# Patient Record
Sex: FEMALE | Race: BLACK OR AFRICAN AMERICAN | Employment: FULL TIME | URBAN - METROPOLITAN AREA
[De-identification: names, ages, dates, MRNs, and addresses within clinical notes are randomized per-mention and may not be internally consistent; named-entity substitution may affect disease eponyms.]

---

## 2018-05-27 ENCOUNTER — HOSPITAL ENCOUNTER (EMERGENCY)
Age: 53
Discharge: HOME | End: 2018-05-27
Payer: COMMERCIAL

## 2018-05-27 VITALS
BODY MASS INDEX: 45.52 KG/M2 | OXYGEN SATURATION: 97 % | HEIGHT: 67 IN | DIASTOLIC BLOOD PRESSURE: 72 MMHG | SYSTOLIC BLOOD PRESSURE: 132 MMHG | WEIGHT: 290 LBS | RESPIRATION RATE: 18 BRPM | HEART RATE: 86 BPM | TEMPERATURE: 98.6 F

## 2018-05-27 DIAGNOSIS — S29.019A THORACIC MYOFASCIAL STRAIN, INITIAL ENCOUNTER: Primary | ICD-10-CM

## 2018-05-27 PROCEDURE — 96372 THER/PROPH/DIAG INJ SC/IM: CPT

## 2018-05-27 PROCEDURE — 99282 EMERGENCY DEPT VISIT SF MDM: CPT

## 2018-05-27 PROCEDURE — 6360000002 HC RX W HCPCS: Performed by: NURSE PRACTITIONER

## 2018-05-27 RX ORDER — CYCLOBENZAPRINE HCL 10 MG
10 TABLET ORAL 3 TIMES DAILY PRN
Qty: 10 TABLET | Refills: 0 | Status: SHIPPED | OUTPATIENT
Start: 2018-05-27 | End: 2019-06-08 | Stop reason: ALTCHOICE

## 2018-05-27 RX ORDER — NAPROXEN 500 MG/1
500 TABLET ORAL 2 TIMES DAILY PRN
Qty: 14 TABLET | Refills: 0 | Status: SHIPPED | OUTPATIENT
Start: 2018-05-27 | End: 2019-06-08

## 2018-05-27 RX ORDER — KETOROLAC TROMETHAMINE 30 MG/ML
60 INJECTION, SOLUTION INTRAMUSCULAR; INTRAVENOUS ONCE
Status: COMPLETED | OUTPATIENT
Start: 2018-05-27 | End: 2018-05-27

## 2018-05-27 RX ORDER — DEXAMETHASONE SODIUM PHOSPHATE 10 MG/ML
10 INJECTION, SOLUTION INTRAMUSCULAR; INTRAVENOUS ONCE
Status: COMPLETED | OUTPATIENT
Start: 2018-05-27 | End: 2018-05-27

## 2018-05-27 RX ADMIN — KETOROLAC TROMETHAMINE 60 MG: 30 INJECTION, SOLUTION INTRAMUSCULAR at 22:32

## 2018-05-27 RX ADMIN — DEXAMETHASONE SODIUM PHOSPHATE 10 MG: 10 INJECTION, SOLUTION INTRAMUSCULAR; INTRAVENOUS at 23:07

## 2018-05-27 ASSESSMENT — PAIN DESCRIPTION - FREQUENCY
FREQUENCY: CONTINUOUS
FREQUENCY: CONTINUOUS

## 2018-05-27 ASSESSMENT — PAIN SCALES - GENERAL
PAINLEVEL_OUTOF10: 5
PAINLEVEL_OUTOF10: 10
PAINLEVEL_OUTOF10: 9

## 2018-05-27 ASSESSMENT — PAIN DESCRIPTION - PROGRESSION
CLINICAL_PROGRESSION: GRADUALLY WORSENING
CLINICAL_PROGRESSION: GRADUALLY IMPROVING

## 2018-05-27 ASSESSMENT — PAIN DESCRIPTION - DESCRIPTORS
DESCRIPTORS: SHARP
DESCRIPTORS: ACHING

## 2018-05-27 ASSESSMENT — PAIN DESCRIPTION - PAIN TYPE
TYPE: ACUTE PAIN
TYPE: ACUTE PAIN

## 2018-05-27 ASSESSMENT — PAIN DESCRIPTION - LOCATION
LOCATION: BACK
LOCATION: BACK

## 2018-05-27 ASSESSMENT — PAIN - FUNCTIONAL ASSESSMENT: PAIN_FUNCTIONAL_ASSESSMENT: 0-10

## 2018-05-27 ASSESSMENT — PAIN DESCRIPTION - ONSET
ONSET: SUDDEN
ONSET: ON-GOING

## 2018-05-27 ASSESSMENT — PAIN DESCRIPTION - ORIENTATION
ORIENTATION: UPPER;RIGHT
ORIENTATION: RIGHT;UPPER

## 2019-04-05 ENCOUNTER — HOSPITAL ENCOUNTER (OUTPATIENT)
Age: 54
Discharge: HOME | End: 2019-04-07
Payer: COMMERCIAL

## 2019-04-05 ENCOUNTER — HOSPITAL ENCOUNTER (OUTPATIENT)
Dept: ULTRASOUND IMAGING | Age: 54
Discharge: HOME | End: 2019-04-07
Payer: COMMERCIAL

## 2019-04-05 ENCOUNTER — HOSPITAL ENCOUNTER (OUTPATIENT)
Dept: GENERAL RADIOLOGY | Age: 54
Discharge: HOME | End: 2019-04-07
Payer: COMMERCIAL

## 2019-04-05 DIAGNOSIS — R07.89 OTHER CHEST PAIN: ICD-10-CM

## 2019-04-05 DIAGNOSIS — R22.32 AXILLARY MASS, LEFT: ICD-10-CM

## 2019-04-05 PROCEDURE — 76882 US LMTD JT/FCL EVL NVASC XTR: CPT

## 2019-04-05 PROCEDURE — 71101 X-RAY EXAM UNILAT RIBS/CHEST: CPT

## 2019-06-08 ENCOUNTER — HOSPITAL ENCOUNTER (EMERGENCY)
Age: 54
Discharge: HOME | End: 2019-06-08
Payer: COMMERCIAL

## 2019-06-08 ENCOUNTER — APPOINTMENT (OUTPATIENT)
Dept: GENERAL RADIOLOGY | Age: 54
End: 2019-06-08
Payer: COMMERCIAL

## 2019-06-08 VITALS
OXYGEN SATURATION: 99 % | HEART RATE: 66 BPM | TEMPERATURE: 97.8 F | BODY MASS INDEX: 45.99 KG/M2 | DIASTOLIC BLOOD PRESSURE: 88 MMHG | HEIGHT: 67 IN | RESPIRATION RATE: 14 BRPM | WEIGHT: 293 LBS | SYSTOLIC BLOOD PRESSURE: 132 MMHG

## 2019-06-08 DIAGNOSIS — S39.012A LUMBOSACRAL STRAIN, INITIAL ENCOUNTER: Primary | ICD-10-CM

## 2019-06-08 DIAGNOSIS — R03.0 ELEVATED BLOOD PRESSURE READING: ICD-10-CM

## 2019-06-08 PROCEDURE — 6360000002 HC RX W HCPCS: Performed by: NURSE PRACTITIONER

## 2019-06-08 PROCEDURE — 99283 EMERGENCY DEPT VISIT LOW MDM: CPT

## 2019-06-08 PROCEDURE — 72100 X-RAY EXAM L-S SPINE 2/3 VWS: CPT

## 2019-06-08 PROCEDURE — 96372 THER/PROPH/DIAG INJ SC/IM: CPT

## 2019-06-08 RX ORDER — CHLORZOXAZONE 500 MG/1
500 TABLET ORAL 3 TIMES DAILY PRN
Qty: 15 TABLET | Refills: 0 | Status: SHIPPED | OUTPATIENT
Start: 2019-06-08 | End: 2019-06-13

## 2019-06-08 RX ORDER — NAPROXEN 500 MG/1
500 TABLET ORAL 2 TIMES DAILY WITH MEALS
Qty: 20 TABLET | Refills: 0 | Status: SHIPPED | OUTPATIENT
Start: 2019-06-08 | End: 2019-06-18

## 2019-06-08 RX ORDER — KETOROLAC TROMETHAMINE 30 MG/ML
30 INJECTION, SOLUTION INTRAMUSCULAR; INTRAVENOUS ONCE
Status: COMPLETED | OUTPATIENT
Start: 2019-06-08 | End: 2019-06-08

## 2019-06-08 RX ADMIN — KETOROLAC TROMETHAMINE 30 MG: 30 INJECTION, SOLUTION INTRAMUSCULAR at 18:35

## 2019-06-08 ASSESSMENT — PAIN DESCRIPTION - DESCRIPTORS
DESCRIPTORS: CONSTANT;DISCOMFORT
DESCRIPTORS: SHARP;RADIATING;THROBBING

## 2019-06-08 ASSESSMENT — PAIN DESCRIPTION - LOCATION
LOCATION: BACK
LOCATION: BACK

## 2019-06-08 ASSESSMENT — PAIN SCALES - GENERAL
PAINLEVEL_OUTOF10: 7
PAINLEVEL_OUTOF10: 10
PAINLEVEL_OUTOF10: 10

## 2019-06-08 ASSESSMENT — PAIN DESCRIPTION - PROGRESSION: CLINICAL_PROGRESSION: NOT CHANGED

## 2019-06-08 ASSESSMENT — PAIN DESCRIPTION - PAIN TYPE
TYPE: ACUTE PAIN
TYPE: ACUTE PAIN

## 2019-06-08 ASSESSMENT — PAIN DESCRIPTION - ORIENTATION
ORIENTATION: LOWER;RIGHT;LEFT
ORIENTATION: LOWER

## 2019-06-08 ASSESSMENT — PAIN - FUNCTIONAL ASSESSMENT
PAIN_FUNCTIONAL_ASSESSMENT: ACTIVITIES ARE NOT PREVENTED
PAIN_FUNCTIONAL_ASSESSMENT: 0-10

## 2019-06-08 ASSESSMENT — PAIN DESCRIPTION - ONSET
ONSET: ON-GOING
ONSET: ON-GOING

## 2019-06-08 ASSESSMENT — PAIN DESCRIPTION - FREQUENCY: FREQUENCY: CONTINUOUS

## 2019-06-08 NOTE — ED NOTES
Patient verbalized understanding of d/c, f/u & Rx instructions. Patient ambulatory to exit.       Clay Ricardo RN  06/08/19 0250

## 2019-06-08 NOTE — ED PROVIDER NOTES
Independent MLP    HPI: Rony Centeno 49 y. o.female with   Past Medical History:   Diagnosis Date    Degenerative arthritis     of lower spine    GERD (gastroesophageal reflux disease)     Headache(784.0)     Hypertension     Hypothyroidism     Obesity     Osteoarthritis      Who presents from home by herself and complains of continued midline low back pain that radiates to her bilateral hips that began two days ago after feeling a pop after leaning forward. The history is obtained from the patient. The mechanism of the injury is apparent. The patient states that the pain is moderate. It is worsened by movement including flexion and extension of the back as well as rotation. Patient denies any distal neurovascular complaints including numbness tingling or weakness. There are no bowel or bladder symptoms reported. The patient denies saddle or groin anesthesia. The patient also denies fevers, chills, weight loss, night sweats, IV drug use, or recent illness.        ROS:   Pertinent positives and negatives are stated within HPI, all other systems reviewed and are negative.    --------------------------------------------- PAST HISTORY ---------------------------------------------  Past Medical History:  has a past medical history of Degenerative arthritis, GERD (gastroesophageal reflux disease), Headache(784.0), Hypertension, Hypothyroidism, Obesity, and Osteoarthritis. Past Surgical History:  has a past surgical history that includes Cholecystectomy; Tonsillectomy; Tubal ligation; and cyst removal.    Social History:  reports that she quit smoking about 24 years ago. Her smoking use included cigarettes. She has a 1.75 pack-year smoking history. She has never used smokeless tobacco. She reports that she drinks about 9.6 oz of alcohol per week. She reports that she does not use drugs.     Family History: family history includes Cancer in her maternal grandmother; Coronary Art Dis in her mother; Heart Attack in her maternal grandfather and mother; Heart Failure in her father; Heart Surgery in her mother; Hypertension in her father, mother, sister, and sister. The patients home medications have been reviewed. Allergies: Patient has no known allergies. ------------------------- NURSING NOTES AND VITALS REVIEWED ---------------------------   The nursing notes within the ED encounter and vital signs as below have been reviewed by myself. /88   Pulse 66   Temp 97.8 °F (36.6 °C) (Oral)   Resp 14   Ht 5' 7\" (1.702 m)   Wt 300 lb (136.1 kg)   LMP 02/14/2013 (Approximate)   SpO2 99%   BMI 46.99 kg/m²   Oxygen Saturation Interpretation: Normal    The patients available past medical records and past encounters were reviewed. Physical exam:    Constitutional:  The patient is comfortable, well appearing, non toxic in NAD. Patient is alert and oriented x3. Head: Head is atraumatic and normocephalic. Eyes: There is no discharge from the eyes. Sclerae are normal.  ENT: The oropharynx is normal. The mouth is normal to inspection. Neck: Normal range of motion of the neck is present there is no JVD present no meningeal signs are present. Respiratory/chest: The chest is nontender breath sounds are normal  Cardiovascular: Regular rate and rhythm is noted. No murmurs. No rubs or gallops. Skin: Skin is warm and dry, Skin exam normal  Neurologic exam: The patient's Tobi Coma Scale is 15. No focal motor deficits. There are no focal sensory deficits. Deep tendon reflexes are intact and present bilaterally in the lower extremities. Babinski is absent. Gait is normal. Symmetric strength and sensation in the lower extremities bilaterally. Back exam: The patient has reproducible tenderness to palpation in the paravertebral lumbar region bilaterally. There is no evidence of localized erythema nor is there any focal warmth to the back.  The patient has no evidence of single vertebral tenderness or any single area of interspace tenderness. There are no palpable deformities, lacerations, abrasions, or stepoffs. No midline cervical, thoracic, or lumbar spine tenderness.           -------------------------------------------------- RESULTS -------------------------------------------------  I have personally reviewed all laboratory and imaging results for this patient. Results are listed below. LABS:  No results found for this visit on 06/08/19. RADIOLOGY:  Interpreted by Radiologist.  XR LUMBAR SPINE (2-3 VIEWS)   Final Result      1. No evidence of lumbar spine fracture. 2. Severe degenerative endplate changes at R9/H9. 3. Calcific density measuring 3 mm overlies expected location of the   left kidney appearing similar compared to prior which could suggest   left renal calculus. If clinically warranted, CT may be helpful for   further evaluation.         Medical decision making:   Mechanical lumbosacral strain without evidence of fracture or neurologic compromise.     Plan:  Review of past medical records for appropriate pain control and outpatient referral.        Impression:  Lumbosacral Strain  Elevated blood pressure reading    Disposition:  Discharge    Condition:  Stable     Billi Oppenheim, APRN - ALEXANDRA  06/08/19 2021

## 2019-06-08 NOTE — ED NOTES
Name: Shaniqua Varner  : 1965  MRN: [id number]    Date: 2019    Benefits of immediately proceeding with Radiology exam outweigh the risks and therefore the following is being waived:      [x] Pregnancy test    [] Protocol for Iodine allergy    [] MRI questionnaire    [] BUN/Creatinine        Samira Lilly, APRN - [address] [facility], [facility]ON[facility] - CNP  [phone]

## 2019-07-12 ENCOUNTER — HOSPITAL ENCOUNTER (OUTPATIENT)
Age: 54
Discharge: HOME | End: 2019-07-14

## 2019-07-12 PROCEDURE — 88342 IMHCHEM/IMCYTCHM 1ST ANTB: CPT

## 2019-07-12 PROCEDURE — 88305 TISSUE EXAM BY PATHOLOGIST: CPT

## 2020-07-27 ENCOUNTER — HOSPITAL ENCOUNTER (OUTPATIENT)
Dept: CT IMAGING | Age: 55
Discharge: HOME | End: 2020-07-29
Payer: COMMERCIAL

## 2020-07-27 ENCOUNTER — HOSPITAL ENCOUNTER (OUTPATIENT)
Age: 55
Discharge: HOME | End: 2020-07-29
Payer: COMMERCIAL

## 2020-07-27 PROCEDURE — 70450 CT HEAD/BRAIN W/O DYE: CPT

## 2020-08-04 ENCOUNTER — HOSPITAL ENCOUNTER (OUTPATIENT)
Dept: MRI IMAGING | Age: 55
Discharge: HOME | End: 2020-08-06
Payer: COMMERCIAL

## 2020-08-04 PROCEDURE — 70544 MR ANGIOGRAPHY HEAD W/O DYE: CPT

## 2020-12-13 ENCOUNTER — APPOINTMENT (OUTPATIENT)
Dept: GENERAL RADIOLOGY | Age: 55
End: 2020-12-13
Payer: COMMERCIAL

## 2020-12-13 ENCOUNTER — HOSPITAL ENCOUNTER (EMERGENCY)
Age: 55
Discharge: HOME | End: 2020-12-13
Attending: FAMILY MEDICINE
Payer: COMMERCIAL

## 2020-12-13 VITALS
DIASTOLIC BLOOD PRESSURE: 91 MMHG | HEART RATE: 63 BPM | SYSTOLIC BLOOD PRESSURE: 172 MMHG | WEIGHT: 265 LBS | HEIGHT: 67 IN | TEMPERATURE: 96.7 F | RESPIRATION RATE: 16 BRPM | OXYGEN SATURATION: 100 % | BODY MASS INDEX: 41.59 KG/M2

## 2020-12-13 LAB
ANION GAP SERPL CALCULATED.3IONS-SCNC: 15 MMOL/L (ref 7–16)
BASOPHILS ABSOLUTE: 0.03 E9/L (ref 0–0.2)
BASOPHILS RELATIVE PERCENT: 0.7 % (ref 0–2)
BUN BLDV-MCNC: 7 MG/DL (ref 6–20)
CALCIUM SERPL-MCNC: 10.2 MG/DL (ref 8.6–10.2)
CHLORIDE BLD-SCNC: 99 MMOL/L (ref 98–107)
CO2: 24 MMOL/L (ref 22–29)
CREAT SERPL-MCNC: 0.8 MG/DL (ref 0.5–1)
EOSINOPHILS ABSOLUTE: 0.03 E9/L (ref 0.05–0.5)
EOSINOPHILS RELATIVE PERCENT: 0.7 % (ref 0–6)
GFR AFRICAN AMERICAN: >60
GFR NON-AFRICAN AMERICAN: >60 ML/MIN/1.73
GLUCOSE BLD-MCNC: 129 MG/DL (ref 74–99)
HCT VFR BLD CALC: 39.9 % (ref 34–48)
HEMOGLOBIN: 12.5 G/DL (ref 11.5–15.5)
IMMATURE GRANULOCYTES #: 0.02 E9/L
IMMATURE GRANULOCYTES %: 0.4 % (ref 0–5)
LYMPHOCYTES ABSOLUTE: 1.85 E9/L (ref 1.5–4)
LYMPHOCYTES RELATIVE PERCENT: 40.7 % (ref 20–42)
MCH RBC QN AUTO: 24.8 PG (ref 26–35)
MCHC RBC AUTO-ENTMCNC: 31.3 % (ref 32–34.5)
MCV RBC AUTO: 79.2 FL (ref 80–99.9)
MONOCYTES ABSOLUTE: 0.56 E9/L (ref 0.1–0.95)
MONOCYTES RELATIVE PERCENT: 12.3 % (ref 2–12)
NEUTROPHILS ABSOLUTE: 2.06 E9/L (ref 1.8–7.3)
NEUTROPHILS RELATIVE PERCENT: 45.2 % (ref 43–80)
PDW BLD-RTO: 14.5 FL (ref 11.5–15)
PLATELET # BLD: 132 E9/L (ref 130–450)
PMV BLD AUTO: 12.6 FL (ref 7–12)
POTASSIUM REFLEX MAGNESIUM: 3.9 MMOL/L (ref 3.5–5)
RBC # BLD: 5.04 E12/L (ref 3.5–5.5)
SODIUM BLD-SCNC: 138 MMOL/L (ref 132–146)
WBC # BLD: 4.6 E9/L (ref 4.5–11.5)

## 2020-12-13 PROCEDURE — 2580000003 HC RX 258: Performed by: FAMILY MEDICINE

## 2020-12-13 PROCEDURE — 6360000002 HC RX W HCPCS: Performed by: FAMILY MEDICINE

## 2020-12-13 PROCEDURE — 71045 X-RAY EXAM CHEST 1 VIEW: CPT

## 2020-12-13 PROCEDURE — 99283 EMERGENCY DEPT VISIT LOW MDM: CPT

## 2020-12-13 PROCEDURE — 96374 THER/PROPH/DIAG INJ IV PUSH: CPT

## 2020-12-13 PROCEDURE — U0003 INFECTIOUS AGENT DETECTION BY NUCLEIC ACID (DNA OR RNA); SEVERE ACUTE RESPIRATORY SYNDROME CORONAVIRUS 2 (SARS-COV-2) (CORONAVIRUS DISEASE [COVID-19]), AMPLIFIED PROBE TECHNIQUE, MAKING USE OF HIGH THROUGHPUT TECHNOLOGIES AS DESCRIBED BY CMS-2020-01-R: HCPCS

## 2020-12-13 PROCEDURE — 36415 COLL VENOUS BLD VENIPUNCTURE: CPT

## 2020-12-13 PROCEDURE — 93005 ELECTROCARDIOGRAM TRACING: CPT | Performed by: FAMILY MEDICINE

## 2020-12-13 PROCEDURE — 80048 BASIC METABOLIC PNL TOTAL CA: CPT

## 2020-12-13 PROCEDURE — 85025 COMPLETE CBC W/AUTO DIFF WBC: CPT

## 2020-12-13 RX ORDER — ONDANSETRON 2 MG/ML
4 INJECTION INTRAMUSCULAR; INTRAVENOUS ONCE
Status: COMPLETED | OUTPATIENT
Start: 2020-12-13 | End: 2020-12-13

## 2020-12-13 RX ORDER — 0.9 % SODIUM CHLORIDE 0.9 %
1000 INTRAVENOUS SOLUTION INTRAVENOUS ONCE
Status: COMPLETED | OUTPATIENT
Start: 2020-12-13 | End: 2020-12-13

## 2020-12-13 RX ORDER — ONDANSETRON 4 MG/1
4 TABLET, ORALLY DISINTEGRATING ORAL EVERY 8 HOURS PRN
Qty: 5 TABLET | Refills: 0 | Status: SHIPPED | OUTPATIENT
Start: 2020-12-13 | End: 2021-12-30

## 2020-12-13 RX ADMIN — ONDANSETRON 4 MG: 2 INJECTION INTRAMUSCULAR; INTRAVENOUS at 20:25

## 2020-12-13 RX ADMIN — SODIUM CHLORIDE 1000 ML: 9 INJECTION, SOLUTION INTRAVENOUS at 20:24

## 2020-12-13 ASSESSMENT — PAIN DESCRIPTION - FREQUENCY: FREQUENCY: CONTINUOUS

## 2020-12-13 ASSESSMENT — PAIN SCALES - GENERAL: PAINLEVEL_OUTOF10: 4

## 2020-12-13 ASSESSMENT — PAIN DESCRIPTION - LOCATION: LOCATION: HEAD

## 2020-12-13 ASSESSMENT — PAIN DESCRIPTION - DESCRIPTORS: DESCRIPTORS: HEADACHE

## 2020-12-13 ASSESSMENT — PAIN DESCRIPTION - PAIN TYPE: TYPE: ACUTE PAIN

## 2020-12-13 NOTE — LETTER
[address] [facility] Emergency Department  [id number] [facility] [address]  Phone: [phone]               December 13, 2020    Patient: Aissatou Burnette   YOB: 1965   Date of Visit: 12/13/2020       To Whom It May Concern: Boni Centeno was seen and treated in our emergency department on 12/13/2020. She may return to work on Pending the results of a COVID-19 test, at the discretion of her employer.       Sincerely,       Skylar Rey MD         Signature:__________________________________

## 2020-12-14 LAB
EKG ATRIAL RATE: 54 BPM
EKG P AXIS: 22 DEGREES
EKG P-R INTERVAL: 160 MS
EKG Q-T INTERVAL: 480 MS
EKG QRS DURATION: 92 MS
EKG QTC CALCULATION (BAZETT): 455 MS
EKG R AXIS: -6 DEGREES
EKG T AXIS: -7 DEGREES
EKG VENTRICULAR RATE: 54 BPM

## 2020-12-14 PROCEDURE — 93010 ELECTROCARDIOGRAM REPORT: CPT | Performed by: INTERNAL MEDICINE

## 2020-12-14 NOTE — ED PROVIDER NOTES
HPI:  12/13/20,   Time: 7:51 PM ROBERT Hardy is a 48 y.o. female presenting to the ED for 2-day history of diarrhea and feeling weak. She denies cough or shortness of breath. She denies change in her taste buds or loss of smell. She denies shortness of breath. ROS:   Pertinent positives and negatives are stated within HPI, all other systems reviewed and are negative.  --------------------------------------------- PAST HISTORY ---------------------------------------------  Past Medical History:  has a past medical history of Degenerative arthritis, GERD (gastroesophageal reflux disease), Headache(784.0), Hypertension, Hypothyroidism, Obesity, and Osteoarthritis. Past Surgical History:  has a past surgical history that includes Cholecystectomy; Tonsillectomy; Tubal ligation; and cyst removal.    Social History:  reports that she quit smoking about 25 years ago. Her smoking use included cigarettes. She has a 1.75 pack-year smoking history. She has never used smokeless tobacco. She reports current alcohol use of about 16.0 standard drinks of alcohol per week. She reports that she does not use drugs. Family History: family history includes Cancer in her maternal grandmother; Coronary Art Dis in her mother; Heart Attack in her maternal grandfather and mother; Heart Failure in her father; Heart Surgery in her mother; Hypertension in her father, mother, sister, and sister. The patients home medications have been reviewed. Allergies: Patient has no known allergies.     -------------------------------------------------- RESULTS -------------------------------------------------  All laboratory and radiology results have been personally reviewed by myself   LABS:  Results for orders placed or performed during the hospital encounter of 12/13/20   CBC Auto Differential   Result Value Ref Range    WBC 4.6 4.5 - 11.5 E9/L    RBC 5.04 3.50 - 5.50 E12/L    Hemoglobin 12.5 11.5 - 15.5 g/dL 63   Temp 96.7 °F (35.9 °C) (Temporal)   Resp 16   Ht 5' 7\" (1.702 m)   Wt 265 lb (120.2 kg)   LMP 02/14/2013 (Approximate)   SpO2 100%   BMI 41.50 kg/m²   Oxygen Saturation Interpretation: Normal      ---------------------------------------------------PHYSICAL EXAM--------------------------------------    Constitutional/General: Alert and oriented x3, well appearing, non toxic in NAD  Head: NC/AT  Eyes: PERRL, EOMI  Mouth: Oropharynx clear, handling secretions, no trismus  Neck: Supple, full ROM, no meningeal signs  Pulmonary: Lungs clear to auscultation bilaterally, no wheezes, rales, or rhonchi. Not in respiratory distress  Cardiovascular:  Regular rate and rhythm, no murmurs, gallops, or rubs. 2+ distal pulses  Abdomen: Soft, non tender, non distended,   Extremities: Moves all extremities x 4. Warm and well perfused  Skin: warm and dry without rash  Neurologic: GCS 15,  Psych: Normal Affect      ------------------------------ ED COURSE/MEDICAL DECISION MAKING----------------------  Medications   0.9 % sodium chloride bolus (0 mLs Intravenous Stopped 12/13/20 2141)   ondansetron (ZOFRAN) injection 4 mg (4 mg Intravenous Given 12/13/20 2025)     EKG: This EKG is signed and interpreted by me. Rate: 54  Rhythm: Sinus  Interpretation: sinus bradycardia      Medical Decision Making:    Straightforward      Time: 9:30 PM  Re-evaluation. Patient´s symptoms are improving  Repeat physical examination is improved    Counseling: The emergency provider has spoken with the patient and discussed todays results, in addition to providing specific details for the plan of care and counseling regarding the diagnosis and prognosis. Questions are answered at this time and they are agreeable with the plan.      --------------------------------- IMPRESSION AND DISPOSITION ---------------------------------    IMPRESSION  1.  Gastroenteritis        DISPOSITION  Disposition: Discharge to home  Patient condition is stable                 Lolis Berkowitz MD  12/19/20 1208

## 2020-12-14 NOTE — ED NOTES
ekg done given to dr, pt stated not eating anything but soup and having headache dizzy, abdomen pain for days, pt resting in bed on monitor     Kimberlyn Canales RN  12/13/20 1934

## 2020-12-15 LAB
SARS-COV-2: NOT DETECTED
SOURCE: NORMAL

## 2021-04-13 ENCOUNTER — HOSPITAL ENCOUNTER (OUTPATIENT)
Age: 56
Discharge: HOME | End: 2021-04-13
Payer: COMMERCIAL

## 2021-04-14 ENCOUNTER — HOSPITAL ENCOUNTER (OUTPATIENT)
Age: 56
Discharge: HOME | End: 2021-04-14
Payer: COMMERCIAL

## 2021-04-14 PROCEDURE — 87329 GIARDIA AG IA: CPT

## 2021-04-14 PROCEDURE — 87449 NOS EACH ORGANISM AG IA: CPT

## 2021-04-14 PROCEDURE — 89055 LEUKOCYTE ASSESSMENT FECAL: CPT

## 2021-04-14 PROCEDURE — 87324 CLOSTRIDIUM AG IA: CPT

## 2021-04-14 PROCEDURE — 87045 FECES CULTURE AEROBIC BACT: CPT

## 2021-04-16 LAB
C DIFF TOXIN/ANTIGEN: NORMAL
GIARDIA ANTIGEN STOOL: NORMAL
WHITE BLOOD CELLS (WBC), STOOL: NORMAL

## 2021-04-17 LAB — CULTURE, STOOL: NORMAL

## 2021-12-30 ENCOUNTER — HOSPITAL ENCOUNTER (OUTPATIENT)
Dept: RADIATION ONCOLOGY | Age: 56
Discharge: HOME | End: 2021-12-30
Payer: COMMERCIAL

## 2021-12-30 ENCOUNTER — TELEPHONE (OUTPATIENT)
Dept: CASE MANAGEMENT | Age: 56
End: 2021-12-30

## 2021-12-30 VITALS — WEIGHT: 282 LBS | BODY MASS INDEX: 44.17 KG/M2

## 2021-12-30 DIAGNOSIS — C50.919 MALIGNANT NEOPLASM OF FEMALE BREAST, UNSPECIFIED ESTROGEN RECEPTOR STATUS, UNSPECIFIED LATERALITY, UNSPECIFIED SITE OF BREAST (HCC): Primary | ICD-10-CM

## 2021-12-30 PROCEDURE — 99205 OFFICE O/P NEW HI 60 MIN: CPT

## 2021-12-30 PROCEDURE — 99205 OFFICE O/P NEW HI 60 MIN: CPT | Performed by: RADIOLOGY

## 2021-12-30 RX ORDER — VITAMIN B COMPLEX
1 CAPSULE ORAL DAILY
COMMUNITY

## 2021-12-30 RX ORDER — MAGNESIUM 30 MG
30 TABLET ORAL 2 TIMES DAILY
COMMUNITY

## 2021-12-30 SDOH — ECONOMIC STABILITY: HOUSING INSECURITY: PLEASE ASSESS YOUR PATIENT'S LEVEL OF DISTRESS CONCERNING HOUSING (SCALE FROM 1-10): 5

## 2021-12-30 NOTE — TELEPHONE ENCOUNTER
Met with patient during her initial consultation with Dr. Bartolo Krishnan for radiation therapy for her left breast ER/SD+ cancer diagnosis by Dr. Hiral Snatiago. Introduced myself and explained my role with cancer patients receiving treatment within our cancer centers. Patient was friendly and receptive. States that all of her questions and concerns were fully addressed during her consultation appointment with the radiation therapy nurse and physician. Denies any problems with insurance coverage for medication or transportation for the daily treatments. Reviewed additional ancillary services available at the center. Denies any referral needs at this time. Upon inquiring, states that her next scheduled appointments with Dr. Nadine Gallegos and Dr. Hiral Santiago at [facility] is scheduled for 6/17/2022. CT Simulation with Dr. Bartolo Krishnan to be scheduled at [facility]. Patient provided with written literature of Patient Resource Booklet: Breast Cancer, ACS Booklet: Exercises after Breast Cancer Surgery, ACS Booklet: What Every Women with Breast Cancer Should Know About Lymphedema, NCI: Hormone Therapy for Breast Cancer, Chemocare: Tamoxifen medication, [facility] Support pamphlet, and [address] flyer. Provided patient with my contact information, office hours, and encouragement to call me with questions or concerns. Patient verbalizes understanding and appreciative of nurse navigator visit. Emotional support provided and greater than 25 minutes spent with patient. Nurse navigator will continue to follow. Gaviota Rodriguez, LUZW, RN, OCN  Oncology Nurse Navigator

## 2021-12-30 NOTE — PROGRESS NOTES
Lu Keyla  1965 55 y.o. Referring Physician:  Dr. Mitzi Bond [facility]    PCP: Huong Portillo DO     There were no vitals filed for this visit. Wt Readings from Last 3 Encounters:   21 282 lb (127.9 kg)   20 265 lb (120.2 kg)   19 300 lb (136.1 kg)        Body mass index is 44.17 kg/m². Chief Complaint: No chief complaint on file. Cancer Staging  No matching staging information was found for the patient. Prior Radiation Therapy? NO    Concurrent Chemo/radiation? NO    Prior Chemotherapy? NO    Prior Hormonal Therapy? 14years old for about 1 year    Head and Neck Cancer? No, patient does NOT have HN cancer. LMP: About 49years old     Age at first Menses: 14    : 10    Para: 3    First gave birth at 13years old   Pt did not breast feed. Current Outpatient Medications   Medication Sig Dispense Refill    magnesium 30 MG tablet Take 30 mg by mouth 2 times daily      b complex vitamins capsule Take 1 capsule by mouth daily      Cholecalciferol (VITAMIN D) 2000 units CAPS capsule Take by mouth      amLODIPine (NORVASC) 5 MG tablet Take 1 tablet by mouth daily 30 tablet 11    pantoprazole (PROTONIX) 40 MG tablet   Take 40 mg by mouth daily       levothyroxine (SYNTHROID) 50 MCG tablet   Take 50 mcg by mouth daily       metoprolol (LOPRESSOR) 100 MG tablet   Take 100 mg by mouth 2 times daily       spironolactone (ALDACTONE) 50 MG tablet   Take 50 mg by mouth daily        No current facility-administered medications for this encounter.        Past Medical History:   Diagnosis Date    Cancer ([facility] [address]     L breast     Degenerative arthritis     of lower spine    GERD (gastroesophageal reflux disease)     Headache(784.0)     Hypertension     Hypothyroidism     Obesity     Osteoarthritis        Past Surgical History:   Procedure Laterality Date    CHOLECYSTECTOMY      COLONOSCOPY      CYST REMOVAL      from hand    ENDOSCOPY, Organizations: Not on file    Attends Club or Organization Meetings: Not on file    Marital Status: Not on file   Intimate Partner Violence:     Fear of Current or Ex-Partner: Not on file    Emotionally Abused: Not on file    Physically Abused: Not on file    Sexually Abused: Not on file   Housing Stability:     Unable to Pay for Housing in the Last Year: Not on file    Number of [place] in the Last Year: Not on file    Unstable Housing in the Last Year: Not on file           Occupation: [employer] [employer]   Retired:  NO        REVIEW OF SYSTEMS: Pt seen in office for consult related to breast cancer. Pt had a mammogram on 8/27 that showed a Birads of 0. Pt then had biopsy at [facility] on 10/27 that showed DCIS. ERPR+, Her2-. Pt then went to [facility] & follows with Dr. Gary Desir. She then had a Anel  bracketed localized lumpectomy of left inferior lateral quadrant, middle depth on 12/7 with Dr. Fozia Avina at [facility]. 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 Dr. Gary Desir is recommending RT followed by tamoxifen. Pt educated on radiation therapy, how it works, what to expect, side effects & management. Provided them with Radiation Therapy & you book & power point. Thoroughly discussed skin care. All questions answered from a nursing perspective. Pt voiced understanding. Dr. Heidy Cardoza updated. Pacemaker/Defibulator/ICD:  No    Mediport: No        FALLS RISK SCREENING ASSESSMENT    Instructions:  Assess the patient and enter the appropriate indicators that are present for fall risk identification. Total the numbers entered and assign a fall risk score from Table 2.  Reassess patient at a minimum every 12 weeks or with status change. Assessment   Date  12/30/2021     1. Mental Ability: confusion/cognitively impaired No - 0       2. Elimination Issues: incontinence, frequency No - 0       3.   Ambulatory: use of assistive devices (walker, cane, off-loading devices), attached to equipment (IV pole, oxygen) No - 0     4. Sensory Limitations: dizziness, vertigo, impaired vision No - 0       5. Age Less than 65 years - 0       6. Medication: diuretics, strong analgesics, hypnotics, sedatives, antihypertensive agents   Yes- 3    7. Falls:  recent history of falls within the last 3 months (not to include slipping or tripping)   No - 0   TOTAL 0    If score of 4 or greater was education given? Yes       TABLE 2   Risk Score Risk Level Plan of Care   0-3 Little or  No Risk 1. Provide assistance as indicated for ambulation activities  2. Reorient confused/cognitively impaired patient  3. Call-light/bell within patient's reach  4. Chair/bed in low position, stretcher/bed with siderails up except when performing patient care activities  5. Educate patient/family/caregiver on falls prevention  6.  Reassess in 12 weeks or with any noted change in patient condition which places them at a risk for a fall   4-6 Moderate Risk 1. Provide assistance as indicated for ambulation activities  2. Reorient confused/cognitively impaired patient  3. Call-light/bell within patient's reach  4. Chair/bed in low position, stretcher/bed with siderails up except when performing patient care activities  5. Educate patient/family/caregiver on falls prevention  6. Falls risk precaution (Yellow sticker Level II) placed on patient chart   7 or   Higher High Risk 1. Place patient in easily observable treatment room  2. Patient attended at all times by family member or staff  3. Provide assistance as indicated for ambulation activities  4. Reorient confused/cognitively impaired patient  5. Call-light/bell within patient's reach  6. Chair/bed in low position, stretcher/bed with siderails up except when performing patient care activities  7. Educate patient/family/caregiver on falls prevention  8.   Falls risk precaution (Yellow sticker Level III) placed on patient chart MALNUTRITION RISK SCREENING ASSESSMENT    Instructions:  Assess the patient and enter the appropriate indicators that are present for nutrition risk identification. Total the numbers entered and assign a risk score. Follow the appropriate action for total score listed below. Assessment   Date  12/30/2021     1. Have you lost weight without trying? 0- No     2. Have you been eating poorly because of a decreased appetite? 0- No   3. Do you have a diagnosis of head and neck cancer? 0- No                                                                                    TOTAL 0          Score of 0-1: No action  Score 2 or greater:  · For Non-Diabetic Patient: Recommend adding Ensure Complete 2 x daily and provide patient with Ensure wellness bag with coupons  · For Diabetic Patient: Recommend adding Glucerna Shake 2 x daily and provide patient with Glucerna Wellness bag with coupons  · Route to the dietitian via [address]    · Are you having difficulty performing daily routine tasks due to fatigue or weakness (ie: bathing/showering, dressing, housework, meal prep, work, , etc):  No     · Do you have any arm flexibility/ROM restrictions, swelling or pain that limit activity: No     · Any changes in memory, attention/focus that impact daily activities: No     · Do you avoid participation in leisure/social activity due to weakness, fatigue or pain: No     ARE ANY OF THE ABOVE ARE ANSWERED YES: No          PT ASSESSMENT FOR REFERRAL    · Have you had any recent falls in the past 2 months: No     · Do you have difficulty going up/down stairs: No     · Are you having difficulty walking: No     · Do you often hold onto furniture/environmental supports or feel off balance when you are walking: No     · Do you need to take rest breaks when you are walking: No     · Any pain on a scale of 1-10 that limits your mobility: No 0/10    ARE ANY OF THE ABOVE ARE ANSWERED YES: No           LYMPHEDEMA SCREENING ASSESSMENT FOR PATIENTS WITH BREAST CANCER    The patient reports the following signs/symptoms of lymphedema: None    Please ask the provider to assess patient for lymphedema for any reported signs or symptoms so a referral to Lymphedema Therapy can be considered. PREHAB AUDIOLOGY REFERRAL    - Is patient planned to receive Cisplatin? No. This patient is not planned to start Cisplatin. - Is patient planned to receive radiation therapy that may be directed toward auditory canals or nerves? No. Patient is not planned to start radiation therapy to auditory canals or nerves. - Is patient complaining of new onset hearing loss? No. Patient is not complaining of new onset hearing loss. Patient education given on RT. The patient expresses understanding and acceptance of instructions.  Jennifer Wells RN 12/30/2021 2:08 PM           Jennifer Wells RN

## 2021-12-30 NOTE — PROGRESS NOTES
Radiation Oncology      Bryan Gitelman. Veronica Eaton  [facility]      Referring Physician: Dr. Chana Mckee      Primary Care Judy Kamara DO   Primary Oncologist: Dr. Theresa Arroyo      Diagnosis: pT1mi pNx cMo left breast cancer - IOQ, s/p BCS   -ER+   -[place]+   -Her2-   -DCIS Gr 2 W/ central comedonecrosis      Service:  Radiation Oncology consultation performed on 12/30/21        HPI:        Ottoniel Samaniego is a very pleasant 55year old with early stage left breast cancer s/p BCS. Pt had a mammogram on 8/27 that showed a Birads of 0. Pt then had biopsy at [facility] on 10/27 that showed DCIS. ERPR+, Her2-. Pt then went to [facility] & follows with Dr. Harry Menendez. She then had a Anel  bracketed localized lumpectomy of left inferior lateral quadrant, middle depth on 12/7 with Dr. Moshe Taylor at [facility]. Final pathology was positive for DCIS, micro invasive carcinoma in background of DCIS, Stage 0, cTis; [id number]. The patient presents today to discuss fractionated external beam radiation therapy as a component of multidisciplinary, definative management. We reviewed the available medical records including the complete medical history of this pt today prior to consultation. Epic -CE and available scanned documents per the Epic Media tab were reviewed PRN. A complete ROS was also performed today and is noted below. During consultation today I personally discussed the pts workup to date; including but not limited to applicable imaging studies, Pathology reports, and interventions. The NCCN guidelines, as pertaining to the above diagnosis were also recapped for the pt today in brief. Today, [employer]  notes Sx that include fatigue. KPS 80-90.          -----      Per [address] ([facility], Dr. Theresa Arroyo):      HPI:   Patient is a delightful 55years old -American lady who had abnormal mammogram of the left breast in October 2021.  Patient underwent US guided core biopsy of left breast on 10/27/2021 at [facility]. imaging with findings of DCIS with low to intermediate grade with ER positive status. Repeat left diagnostic mammogram at [facility] on 2021 showed multiple fine calcifications in the left breast inferior lateral quadrant middle depth. There was a biopsy clip associated with the calcifications. Calcifications span about 5 cm x 3.5 cm x 3.3 cm. Patient underwent left breast Anel  bracketed localized lumpectomy on 2021 by Dr. Luciana Rashid with breast surgery. Final pathology revealed microinvasive carcinoma in a background of ductal carcinoma in situ with mucinous features, nuclear grades 1 and 2, cribriform type with focal comedo necrosis and microcalcifications. Based on sequential sectioning and sampling foci of DCIS were noted to involve 7 cm. Mucinous differentiation was noted within the in situ carcinoma and occasional small foci of mucocele like lesions. One of these foci of dissecting mucin contained atypical epithelium that was not bounded by a myoepithelial cell layer and focus was interpreted as microinvasive carcinoma with nuclear grade 1 (<1 mm). DCIS grade was intermediate. Necrosis was present and was central \"comedo\". There was atypical ductal hyperplasia. Tumor was ER+99%, SD+1%, HER2 negative by IHC score 0. Surgical margins from microinvasive carcinoma were negative with closest at 9 mm (lateral). The closest surgical margin from DCIS was 0.2 mm (lateral). The superior margin was positive focally. The superior margin excision was negative by 2 mm from the margin of resection. Patient was . She had endometrial ablation at age 40 (no mensis since age 40). A/P:      (D05.12) Ductal carcinoma in situ (DCIS) of left breast with microinvasive component (primary encounter diagnosis)    Comment:   Patient is a delightful 55years old -American lady who had abnormal mammogram of the left breast in 2021. Patient underwent US guided core biopsy of left breast on 10/27/2021 at [facility] imaging with findings of DCIS with low to intermediate grade with ER positive status. Repeat left diagnostic mammogram at [facility] on 2021 showed multiple fine calcifications in the left breast inferior lateral quadrant middle depth. There was a biopsy clip associated with the calcifications. Calcifications span about 5 cm x 3.5 cm x 3.3 cm. Patient underwent left breast Anel  bracketed localized lumpectomy on 2021 by Dr. Giovanni Cabrera with breast surgery. Final pathology revealed microinvasive carcinoma in a background of ductal carcinoma in situ with mucinous features, nuclear grades 1 and 2, cribriform type with focal comedo necrosis and microcalcifications. Based on sequential sectioning and sampling foci of DCIS were noted to involve 7 cm. Mucinous differentiation was noted within the in situ carcinoma and occasional small foci of mucocele like lesions. One of these foci of dissecting mucin contained atypical epithelium that was not bounded by a myoepithelial cell layer and focus was interpreted as microinvasive carcinoma with nuclear grade 1 (<1 mm). DCIS grade was intermediate. Necrosis was present and was central \"comedo\". There was atypical ductal hyperplasia. Tumor was ER+99%, ME+1%, HER2 negative by IHC score 0. Surgical margins from microinvasive carcinoma were negative with closest at 9 mm (lateral). The closest surgical margin from DCIS was 0.2 mm (lateral). The superior margin was positive focally. The superior margin excision was negative by 2 mm from the margin of resection. Patient was . She had endometrial ablation at age 40 (no mensis since age 40). Plan:   I discussed diagnosis, pathology, stage, need for radiation oncology consult. Patient chose to proceed with radiation oncology consult close to [place] -[address].   DEXA scan on 2020 showed normal bone mineral density ([facility]). Patient is perimenopausal based on estradiol and FSH testing (endometrial ablation at age 40 years). I discussed tamoxifen start after completion of radiation therapy at 20 mg daily x 5 years. I discussed risk/benefit/side effects of tamoxifen. Patient shared with me that her mother  from CVA at age 48 years but had other comorbid conditions. Plan for follow-up on 2021 on tamoxifen tolerance. -----      Pathology reviewed:      [facility] HISTO PATHOLOGICAL REPORT:    1. The left breast Anel  bracketed localized lumpectomy on 2021: microinvasive carcinoma in a background of ductal carcinoma in situ with mucinous features, nuclear grades 1 and 2, cribriform type with focal comedo necrosis and microcalcifications. Based on sequential sectioning and sampling foci of DCIS were noted to involve 7 cm. Mucinous differentiation was noted within the in situ carcinoma and occasional small foci of mucocele like lesions. One of these foci of dissecting mucin contained atypical epithelium that was not bounded by a myoepithelial cell layer and focus was interpreted as microinvasive carcinoma with nuclear grade 1 (<1 mm). DCIS grade was intermediate. Necrosis was present and was central \"comedo\". There was atypical ductal hyperplasia. Tumor was ER+99%, [place]+1%, HER2 negative by IHC score 0. Surgical margins from microinvasive carcinoma were negative with closest at 9 mm (lateral). The closest surgical margin from DCIS was 0.2 mm (lateral). The superior margin was positive focally.  The superior margin excision was negative by 2 mm from the margin of resection.      -----      Past Medical History:   Diagnosis Date    Cancer ([facility] [address]     L breast     Degenerative arthritis     of lower spine    GERD (gastroesophageal reflux disease)     Headache(784.0)     Hypertension     Hypothyroidism     Obesity     Osteoarthritis        Past Surgical History:   Procedure Laterality Date    CHOLECYSTECTOMY  Not on file     Social Determinants of Health     Financial Resource Strain:     Difficulty of Paying Living Expenses: Not on file   Food Insecurity:     Worried About Running Out of Food in the Last Year: Not on file    Isela of Food in the Last Year: Not on file   Transportation Needs:     Lack of Transportation (Medical): Not on file    Lack of Transportation (Non-Medical):  Not on file   Physical Activity:     Days of Exercise per Week: Not on file    Minutes of Exercise per Session: Not on file   Stress:     Feeling of Stress : Not on file   Social Connections:     Frequency of Communication with Friends and Family: Not on file    Frequency of Social Gatherings with Friends and Family: Not on file    Attends [employer] Services: Not on file    Active Member of Clubs or Organizations: Not on file    Attends Club or Organization Meetings: Not on file    Marital Status: Not on file   Intimate Partner Violence:     Fear of Current or Ex-Partner: Not on file    Emotionally Abused: Not on file    Physically Abused: Not on file    Sexually Abused: Not on file   Housing Stability:     Unable to Pay for Housing in the Last Year: Not on file    Number of [place] in the Last Year: Not on file    Unstable Housing in the Last Year: Not on file           Review of Systems - History obtained from chart review and the patient  General ROS:negative  Psychological ROS: negative  Ophthalmic ROS: negative  ENT ROS: negative  Allergy and Immunology ROS: negative  Hematological and Lymphatic ROS: negative  Endocrine ROS: negative  Breast ROS: negative for NEW breast lumps  Respiratory ROS: no cough, shortness of breath, or wheezing  Cardiovascular ROS: negative  Gastrointestinal ROS: no abdominal pain, change in bowel habits, or black or bloody stools  Genito-Urinary ROS: no dysuria, trouble voiding, or hematuria  Musculoskeletal ROS: negative  Neurological ROS: no TIA or stroke symptoms  Dermatological ROS: negative        Physical Exam  HENT:      Head: Normocephalic and atraumatic. Right Ear: External ear normal.      Left Ear: External ear normal.      Nose: Nose normal.      Mouth/Throat:      Mouth: Mucous membranes are moist.   Eyes:      Extraocular Movements: Extraocular movements intact. Pupils: Pupils are equal, round, and reactive to light. Cardiovascular:      Rate and Rhythm: Normal rate and regular rhythm. Pulses: Normal pulses. Heart sounds: Normal heart sounds. Pulmonary:      Effort: Pulmonary effort is normal.      Breath sounds: Normal breath sounds. Abdominal:      General: Abdomen is flat. Palpations: Abdomen is soft. Musculoskeletal:         General: Normal range of motion. Cervical back: Normal range of motion. Skin:     General: Skin is warm and dry. Neurological:      General: No focal deficit present. Mental Status: She is alert and oriented to person, place, and time. Psychiatric:         Mood and Affect: Mood normal.         Behavior: Behavior normal.         Thought Content: Thought content normal.         Judgment: Judgment normal.           Imaging reviewed:      OSH Mgm reviewed. Radiation Safety and Treatment Support:  -previous Radiation history: No  -history of connective tissue disease: No  -history of autoimmune disease: No  -pregnant: not applicable  -fertility conservation and /or contraception discussed: no  -nutrition consult prior to [address]: Yes  -PEG: No  -Dental evaluation prior to treatment:Yes  -Social Work requested: No  -Oncology Nurse Navigator requested: Yes  -pre + post treatment PT / Rehab / PM+R evaluation considered: Yes  -ICD: No   -ICD brand: -  -[facility] patient navigator: Terry Jett  -Nurse Practitioners for Radiation Oncology:    ---Garo Burdick, MSN, RN, FNP-C   ---Alvarado Self, ANTONINA, RN, FNP-BC        Assessment and Plan:  ERICK CROSS is a pleasant and cooperative 55year old with a recent diagnosis of AJCC stage group IA left breast cancer (microinvasive only). We recommend adjuvant external beam radiation therapy. With a breast conserving surgery, combined with fractionated external beam radiation therapy, there is no difference in overall survival compared to mastectomy: ipsilateral breast tumor recurrence rates drop from over 30 % (35 % in the 12 year analysis of NSABP B-06) to 10% [20 year IBRT ; 39% lumpectomy alone vs 14% lumpectomy + RT /// Cha Watson al. [facility]. 2002 Oct 17;347(16):3714-41]. There is no excess risk of contralateral breast cancer recurrence per [facility] data from a similar era. Multiple other trials have demonstrated a reduction in risk of ipsilateral breast tumor recurrence by 2/3 (whole breast radiation), with further possible benefit of Tamoxifen / Aromasin therapy (per Medical Oncology). The recent EBCTCG meta analysis shows an overall local recurrence rate of less the 10 % for node negative patients. Whole breast radiation to 50 Gy in standard fractions may be combined with a boost based on specific disease and patient characteristics to further improve local control [Juan Manuel H, J Clin Oncol. 2007 Aug 1;25(22):2935-22] and was discussed in detail with the patient as applicable [place] / hypofractionation will also be considered based on the ACR / FLORENCIO guidelines) as well as regional LN irradiation based on clinical and pathology characteristic (a thorough discussion of the potential benefits and risks on this specific aspect of treatment was performed PRN). The risks (detailed discussion), benefits, alternatives, process and logistics of external beam radiation were reviewed. Specifically, we discussed the possible chronic radiation toxicity which may include but is not limited to lymphedema, pneumonitis, skin/cartilage necrosis (rare), rib fracture (rare) , joint pain, brachioplexopathy and cosmetic changes.  We answered all of the patient's questions to the best of our ability who verbalized understanding and seemed satisfied. Radiation planning will commence within 7 days; the next step in management being the simulation scan, with external beam radiation to commence in a timely fashion thereafter. For left sided breast cancer and select right sided cases, utilization of the Active Breathing Coordinator and / or surface guided fractionated external beam radiation therapy (with Vision-RT / 4D) will be considered to reduce radiation dose to the heart (and lung in certain situations) [Carmel et al. PRO. 2015 /// Aniya et al. [place] Stalling. 2011 /// Jayson et al. Ronna Wises. 2012  /// Doug Kasper. 2017]. It was a pleasure meeting Madison Hopson today and we appreciate the referral and opportunity to be involved in her care. We had an extensive discussion today regarding the course to date (including a focused review of theapplicable radiographic and laboratory information), multidisciplinary approach to cancer care, and indications for external beam radiation therapy as a component therein. A literature review and multidisciplinary discussion was performed after seeing this patient due to the complexity of the medical decision making in this case. I personally spent greater than 80 minutes on this case and with this patient. I performed the complete history and physical as above at today's visit, at least 45 minutes was in direct discussion and  regarding disease management.          -sim, cardiac sparing left whole breast RT (DIBH- ABC), short course        Rylan Reyna MD [place] [address] Oncology  Cell: [phone]    [facility]:  [facility]: [phone]  [facility]:  [phone]   FAX:    [phone]  [address] [place] [address] Road:  [phone]   FAX:  [phone]        NOTE: This report was transcribed using voice recognition software. Every effort was made to ensure accuracy; however, inadvertent computerized transcription errors may be present.

## 2022-01-03 ENCOUNTER — TELEPHONE (OUTPATIENT)
Dept: CASE MANAGEMENT | Age: 57
End: 2022-01-03

## 2022-01-03 NOTE — TELEPHONE ENCOUNTER
Attempted contact with patient to provide her [facility] appointment. LM for return call. LUZ LopezW, RN, OCN  Oncology Nurse Navigator

## 2022-01-07 ENCOUNTER — HOSPITAL ENCOUNTER (OUTPATIENT)
Dept: RADIATION ONCOLOGY | Age: 57
Discharge: HOME | End: 2022-01-07
Attending: RADIOLOGY
Payer: COMMERCIAL

## 2022-01-07 PROCEDURE — 77290 THER RAD SIMULAJ FIELD CPLX: CPT | Performed by: RADIOLOGY

## 2022-01-07 PROCEDURE — 77332 RADIATION TREATMENT AID(S): CPT | Performed by: RADIOLOGY

## 2022-01-18 ENCOUNTER — HOSPITAL ENCOUNTER (OUTPATIENT)
Dept: RADIATION ONCOLOGY | Age: 57
Discharge: HOME | End: 2022-01-18
Attending: RADIOLOGY
Payer: COMMERCIAL

## 2022-01-18 PROCEDURE — 77295 3-D RADIOTHERAPY PLAN: CPT | Performed by: RADIOLOGY

## 2022-01-18 PROCEDURE — 77300 RADIATION THERAPY DOSE PLAN: CPT | Performed by: RADIOLOGY

## 2022-01-18 PROCEDURE — 77293 RESPIRATOR MOTION MGMT SIMUL: CPT | Performed by: RADIOLOGY

## 2022-01-18 PROCEDURE — 77334 RADIATION TREATMENT AID(S): CPT | Performed by: RADIOLOGY

## 2022-01-21 ENCOUNTER — HOSPITAL ENCOUNTER (EMERGENCY)
Age: 57
Discharge: HOME | End: 2022-01-21
Attending: EMERGENCY MEDICINE
Payer: COMMERCIAL

## 2022-01-21 ENCOUNTER — APPOINTMENT (OUTPATIENT)
Dept: GENERAL RADIOLOGY | Age: 57
End: 2022-01-21
Payer: COMMERCIAL

## 2022-01-21 VITALS
RESPIRATION RATE: 16 BRPM | SYSTOLIC BLOOD PRESSURE: 142 MMHG | BODY MASS INDEX: 43.95 KG/M2 | WEIGHT: 280 LBS | TEMPERATURE: 97 F | OXYGEN SATURATION: 99 % | HEIGHT: 67 IN | HEART RATE: 68 BPM | DIASTOLIC BLOOD PRESSURE: 94 MMHG

## 2022-01-21 DIAGNOSIS — J06.9 VIRAL URI: Primary | ICD-10-CM

## 2022-01-21 DIAGNOSIS — Z20.822 ENCOUNTER FOR LABORATORY TESTING FOR COVID-19 VIRUS: ICD-10-CM

## 2022-01-21 PROCEDURE — 71045 X-RAY EXAM CHEST 1 VIEW: CPT

## 2022-01-21 PROCEDURE — U0003 INFECTIOUS AGENT DETECTION BY NUCLEIC ACID (DNA OR RNA); SEVERE ACUTE RESPIRATORY SYNDROME CORONAVIRUS 2 (SARS-COV-2) (CORONAVIRUS DISEASE [COVID-19]), AMPLIFIED PROBE TECHNIQUE, MAKING USE OF HIGH THROUGHPUT TECHNOLOGIES AS DESCRIBED BY CMS-2020-01-R: HCPCS

## 2022-01-21 PROCEDURE — U0005 INFEC AGEN DETEC AMPLI PROBE: HCPCS

## 2022-01-21 PROCEDURE — 99283 EMERGENCY DEPT VISIT LOW MDM: CPT

## 2022-01-21 RX ORDER — GUAIFENESIN AND CODEINE PHOSPHATE 100; 10 MG/5ML; MG/5ML
5 SOLUTION ORAL 3 TIMES DAILY PRN
Qty: 75 ML | Refills: 0 | Status: SHIPPED | OUTPATIENT
Start: 2022-01-21 | End: 2022-01-26

## 2022-01-21 NOTE — ED PROVIDER NOTES
HPI:  1/21/22, Time: 12:14 PM ROBERT Real is a 65 y.o. female presenting to the ED for sinus congestion, cough, beginning 1 week ago. Had telehealth with PCP 2 days ago and started a Zpack and tussin cough syrup. No improvement. States coughing all night and can't sleep. The complaint has been persistent, moderate in severity, and worsened by nothing. Patient denies fever/chills, sore throat,  chest pain, shortness of breath, edema, headache, visual disturbances, focal paresthesias, focal weakness, abdominal pain, nausea, vomiting, diarrhea, constipation, dysuria, hematuria, trauma, neck or back pain, rash or other complaints. ROS:   A complete review of systems was performed and all pertinent positives and negatives are stated within HPI, all other systems reviewed and are negative.      --------------------------------------------- PAST HISTORY ---------------------------------------------  Past Medical History:  has a past medical history of Cancer ([facility] [address] Degenerative arthritis, GERD (gastroesophageal reflux disease), Headache(784.0), Hypertension, Hypothyroidism, Obesity, and Osteoarthritis. Past Surgical History:  has a past surgical history that includes Cholecystectomy; Tonsillectomy; Tubal ligation; cyst removal; Colonoscopy; and Endoscopy, colon, diagnostic. Social History:  reports that she quit smoking about 27 years ago. Her smoking use included cigarettes. She has a 1.75 pack-year smoking history. She has never used smokeless tobacco. She reports current alcohol use of about 5.0 standard drinks of alcohol per week. She reports that she does not use drugs. Family History: family history includes Cancer in her maternal grandmother; Coronary Art Dis in her mother; Heart Attack in her maternal grandfather and mother; Heart Failure in her father; Heart Surgery in her mother; Hypertension in her father, mother, sister, and sister.      The patients home medications have been reviewed. Allergies: Patient has no known allergies. ----------------------------------------PHYSICAL EXAM--------------------------------------  Constitutional:  Well developed, well nourished, morbidly obese, no acute distress, non-toxic appearance   Eyes:  PERRL, conjunctiva normal, EOMI  HENT:  Atraumatic, external ears normal, nose normal, oropharynx moist, no pharyngeal exudates, redness or swelling. TMs clear and intact bilaterally. Sinus congestion noted. Neck- normal range of motion, no nuchal rigidity   Respiratory:  No respiratory distress, normal breath sounds, no rales, no wheezing   Cardiovascular:  Normal rate, normal rhythm, no murmurs, no gallops, no rubs. Radial pulses 2+ bilaterally. GI:  Soft, nondistended, normal bowel sounds, nontender, no rebound, no guarding   :  No costovertebral angle tenderness   Musculoskeletal:  No edema, no tenderness, no deformities. Integument:  Well hydrated, no visible rash. Adequate perfusion. Lymphatic:  No cervical lymphadenopathy noted   Neurologic:  Alert & oriented x 3, CN 2-12 normal,  no focal deficits noted. Normal gait. Psychiatric:  Speech and behavior appropriate       -------------------------------------------------- RESULTS -------------------------------------------------  I have personally reviewed all laboratory and imaging results for this patient. Results are listed below. LABS:  No results found for this visit on 01/21/22. RADIOLOGY:  Interpreted by Radiologist.  XR CHEST PORTABLE   Final Result   No acute process. ------------------------- NURSING NOTES AND VITALS REVIEWED ---------------------------  The nursing notes within the ED encounter and vital signs as below have been reviewed by myself.     BP (!) 142/94   Pulse 68   Temp 97 °F (36.1 °C) (Temporal)   Resp 16   Ht 5' 7\" (1.702 m)   Wt 280 lb (127 kg)   LMP 02/14/2013 (Approximate)   SpO2 99%   BMI 43.85 kg/m²   Oxygen Saturation Interpretation: Normal      The patients available past medical records and past encounters were reviewed. ------------------------------ ED COURSE/MEDICAL DECISION MAKING----------------------  Medications - No data to display        Procedures:   none      Medical Decision Making:    Chest x-ray clear. Has obvious sinus congestion, runny nose and a persistent cough. COVID swab done, aware that results will come back in 3 to 5 days. Per chart review she is to start radiation oncology treatment for ductal carcinoma in situ of the breast.  We will avoid steroids at this time so as not to interfere with any potential issues that could cause interference with that treatment. We will provide codeine cough syrup at this time. Was advised to use over-the-counter decongestant such as Coricidin that will not cause high blood pressure issues for her. She understands and agrees with the plan. She appears well, nontoxic, neurovascularly intact, no hypoxia and ambulatory upon discharge. She is tolerating her secretions. Patient was explicitly instructed on specific signs and symptoms on which to return to the emergency room for. Patient was instructed to return to the ER for any new or worsening symptoms. Additional discharge instructions were given verbally. All questions were answered. Patient is comfortable and agreeable with discharge plan. Patient in no acute distress and non-toxic in appearance. This patient's ED course included: a personal history and physicial eaxmination    This patient has remained hemodynamically stable during their ED course. Veronica Corbin, DO, am the Primary Provider of Record    Counseling: The emergency provider has spoken with the patient and discussed todays results, in addition to providing specific details for the plan of care and counseling regarding the diagnosis and prognosis.   Questions are answered at this time and they are agreeable with the plan.    --------------------------- IMPRESSION AND DISPOSITION ---------------------------------    IMPRESSION  1. Viral URI    2.  Encounter for laboratory testing for COVID-19 virus        DISPOSITION  Disposition: Discharge to home  Patient condition is stable             Carlos Crooks DO  01/21/22 6023

## 2022-01-22 LAB — SARS-COV-2, PCR: DETECTED

## 2022-01-22 NOTE — RESULT ENCOUNTER NOTE
Patient called and informed of positive covid result. Patient instructed to quarantine and inform anyone that they were in close contact with of result. Patient instructed to return to ER with any worsening symptoms. Patient verbalized understanding.

## 2022-01-24 ENCOUNTER — HOSPITAL ENCOUNTER (OUTPATIENT)
Dept: RADIATION ONCOLOGY | Age: 57
End: 2022-01-24
Attending: RADIOLOGY
Payer: COMMERCIAL

## 2022-01-25 ENCOUNTER — APPOINTMENT (OUTPATIENT)
Dept: RADIATION ONCOLOGY | Age: 57
End: 2022-01-25
Attending: RADIOLOGY
Payer: COMMERCIAL

## 2022-01-26 ENCOUNTER — APPOINTMENT (OUTPATIENT)
Dept: RADIATION ONCOLOGY | Age: 57
End: 2022-01-26
Attending: RADIOLOGY
Payer: COMMERCIAL

## 2022-01-27 ENCOUNTER — APPOINTMENT (OUTPATIENT)
Dept: RADIATION ONCOLOGY | Age: 57
End: 2022-01-27
Attending: RADIOLOGY
Payer: COMMERCIAL

## 2022-01-28 ENCOUNTER — APPOINTMENT (OUTPATIENT)
Dept: RADIATION ONCOLOGY | Age: 57
End: 2022-01-28
Attending: RADIOLOGY
Payer: COMMERCIAL

## 2022-01-31 ENCOUNTER — HOSPITAL ENCOUNTER (OUTPATIENT)
Dept: RADIATION ONCOLOGY | Age: 57
Discharge: HOME | End: 2022-01-31
Attending: RADIOLOGY
Payer: COMMERCIAL

## 2022-01-31 PROCEDURE — 77417 THER RADIOLOGY PORT IMAGE(S): CPT | Performed by: RADIOLOGY

## 2022-01-31 PROCEDURE — 77412 RADIATION TX DELIVERY LVL 3: CPT | Performed by: RADIOLOGY

## 2022-02-01 ENCOUNTER — HOSPITAL ENCOUNTER (OUTPATIENT)
Dept: RADIATION ONCOLOGY | Age: 57
Discharge: HOME | End: 2022-02-01
Attending: RADIOLOGY
Payer: COMMERCIAL

## 2022-02-01 VITALS
WEIGHT: 291 LBS | SYSTOLIC BLOOD PRESSURE: 149 MMHG | HEART RATE: 64 BPM | TEMPERATURE: 97.4 F | DIASTOLIC BLOOD PRESSURE: 81 MMHG | BODY MASS INDEX: 45.58 KG/M2 | RESPIRATION RATE: 18 BRPM

## 2022-02-01 DIAGNOSIS — Z17.0 MALIGNANT NEOPLASM OF LOWER-OUTER QUADRANT OF LEFT BREAST OF FEMALE, ESTROGEN RECEPTOR POSITIVE (HCC): ICD-10-CM

## 2022-02-01 DIAGNOSIS — C50.512 MALIGNANT NEOPLASM OF LOWER-OUTER QUADRANT OF LEFT BREAST OF FEMALE, ESTROGEN RECEPTOR POSITIVE (HCC): ICD-10-CM

## 2022-02-01 PROBLEM — C50.519 MALIGNANT NEOPLASM OF LOWER-OUTER QUADRANT OF BREAST OF FEMALE, ESTROGEN RECEPTOR POSITIVE (HCC): Status: ACTIVE | Noted: 2022-02-01

## 2022-02-01 PROCEDURE — 77412 RADIATION TX DELIVERY LVL 3: CPT | Performed by: RADIOLOGY

## 2022-02-01 ASSESSMENT — PAIN DESCRIPTION - LOCATION: LOCATION: BREAST

## 2022-02-01 ASSESSMENT — PAIN SCALES - GENERAL: PAINLEVEL_OUTOF10: 3

## 2022-02-01 NOTE — PROGRESS NOTES
DEPARTMENT OF RADIATION ONCOLOGY   ON TREATMENT VISIT       2/1/2022      NAME:  Yves Celis    YOB: 1965    DIAGNOSIS:  pT1mi pNx cMo left breast cancer - IOQ, s/p BCS              -ER+              -MN+              -Her2-              -DCIS Gr 2 W/ central comedonecrosis      SUBJECTIVE:   Yves Celis has now received 266 cGy in 1 fractions directed to the left breast.      Past medical, surgical, social and family histories reviewed and updated as indicated. PAIN: none    ALLERGIES:  Patient has no known allergies. Current Outpatient Medications   Medication Sig Dispense Refill    magnesium 30 MG tablet Take 30 mg by mouth 2 times daily      b complex vitamins capsule Take 1 capsule by mouth daily      Cholecalciferol (VITAMIN D) 2000 units CAPS capsule Take by mouth      amLODIPine (NORVASC) 5 MG tablet Take 1 tablet by mouth daily 30 tablet 11    pantoprazole (PROTONIX) 40 MG tablet   Take 40 mg by mouth daily       levothyroxine (SYNTHROID) 50 MCG tablet   Take 50 mcg by mouth daily       metoprolol (LOPRESSOR) 100 MG tablet   Take 100 mg by mouth 2 times daily       spironolactone (ALDACTONE) 50 MG tablet   Take 50 mg by mouth daily        No current facility-administered medications for this encounter. OBJECTIVE:  Alert and fully ambulatory. Pleasant and conversant. Physical Examination:General appearance - alert, well appearing, and in no distress and overweight:  Constitutional: A well developed, well nourished 65 y.o. female who is alert, oriented, cooperative and in no apparent distress. HEENT:   Skin:  Warm and dry. No obvious rashes.     Vitals:    02/01/22 1226   BP: (!) 149/81   Pulse: 64   Resp: 18   Temp: 97.4 °F (36.3 °C)   TempSrc: Skin   Weight: 291 lb (132 kg)       Wt Readings from Last 3 Encounters:   02/01/22 291 lb (132 kg)   01/21/22 280 lb (127 kg)   12/30/21 282 lb (127.9 kg)       ASSESSMENT/PLAN:     Patient is tolerating treatments well with expected toxicities. Current and planned dose reviewed. Goals of treatment and potential side effects were reviewed with the patient. Treatment imaging has been personally reviewed for accuracy and precision. Questions answered to apparent satisfaction. Treatments will continue as planned. Thank you for the opportunity to participate in multidisciplinary management of this remarkable and pleasant patient.       Latasha Mcneil MD    Department of Radiation Oncology  [facility]) [facility]: [phone] ([facility]: [phone])  [address] [place] [address]) [facility]: [phone])  [facility]) [facility]:  [phone] ([facility]:  [phone])

## 2022-02-01 NOTE — PROGRESS NOTES
Jackie Maged  2/1/2022  Wt Readings from Last 3 Encounters:   02/01/22 291 lb (132 kg)   01/21/22 280 lb (127 kg)   12/30/21 282 lb (127.9 kg)     Body mass index is 45.58 kg/m². Treatment Area:left breast Ca    Patient was seen today for weekly visit.      Comfort Alteration  KPS:90%  Fatigue: Mild    Nutritional Alteration  Anorexia: No   Nausea: No   Vomiting: No     Skin Alteration   Sensation:good, some sharp pains    Radiation Dermatitis:  na    Mucous Membrane Alteration  Drainage: No  Lymphedema: No    Emotional  Coping: effective    Sexuality Alteration  na    Injury, potential bleeding or infection: na        Lab Results   Component Value Date    WBC 4.6 12/13/2020     12/13/2020         BP (!) 149/81   Pulse 64   Temp 97.4 °F (36.3 °C) (Skin)   Resp 18   Wt 291 lb (132 kg)   LMP 02/14/2013 (Approximate)   BMI 45.58 kg/m²   BP within normal range? no   -if no, manually recheck in 5-10 min  NEW    Patient takes BOP medication and this is her range            Assessment/Plan:2/21fx/266/5256cGY and tolerating    Zulema Orourke RN

## 2022-02-02 ENCOUNTER — HOSPITAL ENCOUNTER (OUTPATIENT)
Dept: RADIATION ONCOLOGY | Age: 57
Discharge: HOME | End: 2022-02-02
Attending: RADIOLOGY
Payer: COMMERCIAL

## 2022-02-02 PROCEDURE — 77412 RADIATION TX DELIVERY LVL 3: CPT | Performed by: RADIOLOGY

## 2022-02-03 ENCOUNTER — HOSPITAL ENCOUNTER (OUTPATIENT)
Dept: RADIATION ONCOLOGY | Age: 57
Discharge: HOME | End: 2022-02-03
Attending: RADIOLOGY
Payer: COMMERCIAL

## 2022-02-03 PROCEDURE — 77412 RADIATION TX DELIVERY LVL 3: CPT | Performed by: RADIOLOGY

## 2022-02-04 ENCOUNTER — APPOINTMENT (OUTPATIENT)
Dept: RADIATION ONCOLOGY | Age: 57
End: 2022-02-04
Attending: RADIOLOGY
Payer: COMMERCIAL

## 2022-02-07 ENCOUNTER — HOSPITAL ENCOUNTER (OUTPATIENT)
Dept: RADIATION ONCOLOGY | Age: 57
Discharge: HOME | End: 2022-02-07
Attending: RADIOLOGY
Payer: COMMERCIAL

## 2022-02-07 PROCEDURE — 77427 RADIATION TX MANAGEMENT X5: CPT | Performed by: RADIOLOGY

## 2022-02-07 PROCEDURE — 77334 RADIATION TREATMENT AID(S): CPT | Performed by: RADIOLOGY

## 2022-02-07 PROCEDURE — 77307 TELETHX ISODOSE PLAN CPLX: CPT | Performed by: RADIOLOGY

## 2022-02-07 PROCEDURE — 77412 RADIATION TX DELIVERY LVL 3: CPT | Performed by: RADIOLOGY

## 2022-02-07 PROCEDURE — 77336 RADIATION PHYSICS CONSULT: CPT | Performed by: RADIOLOGY

## 2022-02-08 ENCOUNTER — APPOINTMENT (OUTPATIENT)
Dept: RADIATION ONCOLOGY | Age: 57
End: 2022-02-08
Attending: RADIOLOGY
Payer: COMMERCIAL

## 2022-02-09 ENCOUNTER — HOSPITAL ENCOUNTER (OUTPATIENT)
Dept: RADIATION ONCOLOGY | Age: 57
Discharge: HOME | End: 2022-02-09
Attending: RADIOLOGY
Payer: COMMERCIAL

## 2022-02-09 PROCEDURE — 77417 THER RADIOLOGY PORT IMAGE(S): CPT | Performed by: RADIOLOGY

## 2022-02-09 PROCEDURE — 77412 RADIATION TX DELIVERY LVL 3: CPT | Performed by: RADIOLOGY

## 2022-02-10 ENCOUNTER — HOSPITAL ENCOUNTER (OUTPATIENT)
Dept: RADIATION ONCOLOGY | Age: 57
Discharge: HOME | End: 2022-02-10
Attending: RADIOLOGY
Payer: COMMERCIAL

## 2022-02-10 ENCOUNTER — TELEPHONE (OUTPATIENT)
Dept: CASE MANAGEMENT | Age: 57
End: 2022-02-10

## 2022-02-10 VITALS
OXYGEN SATURATION: 98 % | HEART RATE: 69 BPM | DIASTOLIC BLOOD PRESSURE: 80 MMHG | RESPIRATION RATE: 18 BRPM | TEMPERATURE: 97.1 F | SYSTOLIC BLOOD PRESSURE: 150 MMHG

## 2022-02-10 DIAGNOSIS — C50.919 MALIGNANT NEOPLASM OF FEMALE BREAST, UNSPECIFIED ESTROGEN RECEPTOR STATUS, UNSPECIFIED LATERALITY, UNSPECIFIED SITE OF BREAST (HCC): Primary | ICD-10-CM

## 2022-02-10 PROCEDURE — 77412 RADIATION TX DELIVERY LVL 3: CPT | Performed by: RADIOLOGY

## 2022-02-10 PROCEDURE — 99999 PR OFFICE/OUTPT VISIT,PROCEDURE ONLY: CPT | Performed by: RADIOLOGY

## 2022-02-10 ASSESSMENT — PAIN DESCRIPTION - PAIN TYPE: TYPE: ACUTE PAIN

## 2022-02-10 ASSESSMENT — PAIN SCALES - GENERAL: PAINLEVEL_OUTOF10: 5

## 2022-02-10 ASSESSMENT — PAIN DESCRIPTION - LOCATION: LOCATION: BACK

## 2022-02-10 NOTE — PATIENT INSTRUCTIONS
Continue daily fractionated radiation therapy as scheduled. Please see weekly OTV note and intial consultation letter in [facility] for clinical details. Dejan Chew. Nathan Petersen MD MS [place]:  [phone]   FAX: [phone]  [facility]:  [phone]   FAX:    [phone]  [facility]:  [phone]   FAX:  [phone]  Email: [email or web address]

## 2022-02-10 NOTE — PROGRESS NOTES
DEPARTMENT OF RADIATION ONCOLOGY ON TREATMENT VISIT         2/10/2022      NAME:  Gerardo Higuera    YOB: 1965    Diagnosis: breast cancer    SUBJECTIVE:   Gerardo Higuera has now received fractionated external beam radiation therapy - ongoing. Past medical, surgical, social and family histories reviewed and updated as indicated. Pain: controlled    ALLERGIES:  Patient has no known allergies. Current Outpatient Medications   Medication Sig Dispense Refill    magnesium 30 MG tablet Take 30 mg by mouth 2 times daily      b complex vitamins capsule Take 1 capsule by mouth daily      Cholecalciferol (VITAMIN D) 2000 units CAPS capsule Take by mouth      amLODIPine (NORVASC) 5 MG tablet Take 1 tablet by mouth daily 30 tablet 11    pantoprazole (PROTONIX) 40 MG tablet   Take 40 mg by mouth daily       levothyroxine (SYNTHROID) 50 MCG tablet   Take 50 mcg by mouth daily       metoprolol (LOPRESSOR) 100 MG tablet   Take 100 mg by mouth 2 times daily       spironolactone (ALDACTONE) 50 MG tablet   Take 50 mg by mouth daily        No current facility-administered medications for this encounter. OBJECTIVE:  Alert and fully ambulatory. Pleasant and conversant. Physical Examination: General appearance - alert, well appearing, and in no distress. Wt Readings from Last 3 Encounters:   02/01/22 291 lb (132 kg)   01/21/22 280 lb (127 kg)   12/30/21 282 lb (127.9 kg)         ASSESSMENT/PLAN:     Patient is tolerating treatments well with expected toxicities. RBA were reviewed prior to first fraction and PRN. Current and planned dose reviewed. Goals of treatment and potential side effects were reviewed with the patient PRN. Treatment imaging has been personally reviewed for accuracy and precision. Questions answered to apparent satisfaction. Treatments will continue as planned. Bridger Love.  Adi Bowen MD [id number] [facility] Oncologist        PHYSICIANS [facility] [place]): [phone] /// FAX: [phone]  [facility] (Sally Mart[facility]nez): [phone] /// FAX: [phone]  [facility] [place]): [phone] /// FAX: [phone]

## 2022-02-10 NOTE — PROGRESS NOTES
Nino Alison  2/10/2022  Wt Readings from Last 3 Encounters:   02/01/22 291 lb (132 kg)   01/21/22 280 lb (127 kg)   12/30/21 282 lb (127.9 kg)     There is no height or weight on file to calculate BMI. Treatment Area:left breast ca    Patient was seen today for weekly visit.      Comfort Alteration  KPS:80%  Fatigue: Mild    Nutritional Alteration  Anorexia: No   Nausea: No   Vomiting: No     Skin Alteration   Sensation:good, using lotion    Radiation Dermatitis:  na    Mucous Membrane Alteration  Drainage: No  Lymphedema: No    Emotional  Coping: effective    Sexuality Alteration  na    Injury, potential bleeding or infection: na        Lab Results   Component Value Date    WBC 4.6 12/13/2020     12/13/2020         BP (!) 150/80   Pulse 69   Temp 97.1 °F (36.2 °C) (Skin)   Resp 18   LMP 02/14/2013 (Approximate)   SpO2 98%   BP within normal range? no  Patient is very uncomfortable right now and is going to take pain medication        Assessment/Plan: 7/21fx   1862/5256cGY and tolerating    Wai Stoll RN

## 2022-02-10 NOTE — TELEPHONE ENCOUNTER
Met with patient briefly following her daily radiation therapy treatment for follow up. Patient has had 7 treatments so far. Upon inquiring, states that she is doing well with the treatments. Denies any redness within the treatment field. Provided support and encouragement to keep the area within the treatment field moisturized per Dr. Tania Wong directions. Aware not to apply 3 hours prior to treatment. Denies any problems with transportation for treatment. She goes to work after her radiation therapy treatments. Declines any current needs for assistance for NN. Patient appreciative of visit. Will continue to follow as needed. Gaviota Garay, BSW, RN, OCN  Oncology Nurse Navigator Patient notified, please order.

## 2022-02-11 ENCOUNTER — HOSPITAL ENCOUNTER (OUTPATIENT)
Dept: RADIATION ONCOLOGY | Age: 57
Discharge: HOME | End: 2022-02-11
Attending: RADIOLOGY
Payer: COMMERCIAL

## 2022-02-11 PROCEDURE — 77412 RADIATION TX DELIVERY LVL 3: CPT | Performed by: RADIOLOGY

## 2022-02-14 ENCOUNTER — HOSPITAL ENCOUNTER (OUTPATIENT)
Dept: RADIATION ONCOLOGY | Age: 57
Discharge: HOME | End: 2022-02-14
Attending: RADIOLOGY
Payer: COMMERCIAL

## 2022-02-14 PROCEDURE — 77412 RADIATION TX DELIVERY LVL 3: CPT | Performed by: RADIOLOGY

## 2022-02-15 ENCOUNTER — HOSPITAL ENCOUNTER (OUTPATIENT)
Dept: RADIATION ONCOLOGY | Age: 57
Discharge: HOME | End: 2022-02-15
Attending: RADIOLOGY
Payer: COMMERCIAL

## 2022-02-15 DIAGNOSIS — C50.919 MALIGNANT NEOPLASM OF FEMALE BREAST, UNSPECIFIED ESTROGEN RECEPTOR STATUS, UNSPECIFIED LATERALITY, UNSPECIFIED SITE OF BREAST (HCC): Primary | ICD-10-CM

## 2022-02-15 PROCEDURE — 77336 RADIATION PHYSICS CONSULT: CPT | Performed by: RADIOLOGY

## 2022-02-15 PROCEDURE — 77427 RADIATION TX MANAGEMENT X5: CPT | Performed by: RADIOLOGY

## 2022-02-15 PROCEDURE — 77412 RADIATION TX DELIVERY LVL 3: CPT | Performed by: RADIOLOGY

## 2022-02-15 NOTE — PATIENT INSTRUCTIONS
Continue daily fractionated radiation therapy as scheduled. Please see weekly OTV note and intial consultation letter in [facility] for clinical details. Natalei Reddy. Lilly Hansen MD MS [place]:  [phone]   FAX: [phone]  [facility]:  [phone]   FAX:    [phone]  [address] [place] [address] Road:  [phone]   FAX:  [phone]  Email: [email or web address]

## 2022-02-16 ENCOUNTER — HOSPITAL ENCOUNTER (OUTPATIENT)
Dept: RADIATION ONCOLOGY | Age: 57
Discharge: HOME | End: 2022-02-16
Attending: RADIOLOGY
Payer: COMMERCIAL

## 2022-02-16 PROCEDURE — 77417 THER RADIOLOGY PORT IMAGE(S): CPT | Performed by: RADIOLOGY

## 2022-02-16 PROCEDURE — 77412 RADIATION TX DELIVERY LVL 3: CPT | Performed by: RADIOLOGY

## 2022-02-17 ENCOUNTER — HOSPITAL ENCOUNTER (OUTPATIENT)
Dept: RADIATION ONCOLOGY | Age: 57
Discharge: HOME | End: 2022-02-17
Attending: RADIOLOGY
Payer: COMMERCIAL

## 2022-02-17 VITALS
OXYGEN SATURATION: 96 % | TEMPERATURE: 97.6 F | HEART RATE: 74 BPM | RESPIRATION RATE: 18 BRPM | SYSTOLIC BLOOD PRESSURE: 140 MMHG | DIASTOLIC BLOOD PRESSURE: 86 MMHG

## 2022-02-17 PROCEDURE — 77412 RADIATION TX DELIVERY LVL 3: CPT | Performed by: RADIOLOGY

## 2022-02-17 ASSESSMENT — PAIN DESCRIPTION - ORIENTATION: ORIENTATION: LEFT

## 2022-02-17 ASSESSMENT — PAIN SCALES - GENERAL: PAINLEVEL_OUTOF10: 4

## 2022-02-17 ASSESSMENT — PAIN DESCRIPTION - LOCATION: LOCATION: BREAST

## 2022-02-17 NOTE — PROGRESS NOTES
Aguilar Lj  2/17/2022  Wt Readings from Last 3 Encounters:   02/01/22 291 lb (132 kg)   01/21/22 280 lb (127 kg)   12/30/21 282 lb (127.9 kg)     There is no height or weight on file to calculate BMI. Treatment Area:CTV L breast    Patient was seen today for weekly visit. Comfort Alteration  KPS:90%  Fatigue: Moderate    Nutritional Alteration  Anorexia: No   Nausea: No   Vomiting: No     Skin Alteration   Sensation:yes, pink    Radiation Dermatitis:  Yes, advised to use hydrocortisone cream    Mucous Membrane Alteration  Drainage: No  Lymphedema: No    Emotional  Coping: somewhat effective    Sexuality Alteration  n/a    Injury, potential bleeding or infection: no        Lab Results   Component Value Date    WBC 4.6 12/13/2020     12/13/2020         BP (!) 140/86   Pulse 74   Temp 97.6 °F (36.4 °C) (Temporal)   Resp 18   LMP 02/14/2013 (Approximate)   SpO2 96%   BP within normal range? yes       Assessment/Plan: Pt completed 12/21fx and 3192/5256cGy. Pt is tolerating tx well thus far.     Roista Waters RN

## 2022-02-18 ENCOUNTER — HOSPITAL ENCOUNTER (OUTPATIENT)
Dept: RADIATION ONCOLOGY | Age: 57
Discharge: HOME | End: 2022-02-18
Attending: RADIOLOGY
Payer: COMMERCIAL

## 2022-02-18 PROCEDURE — 77412 RADIATION TX DELIVERY LVL 3: CPT | Performed by: RADIOLOGY

## 2022-02-21 ENCOUNTER — HOSPITAL ENCOUNTER (OUTPATIENT)
Dept: RADIATION ONCOLOGY | Age: 57
Discharge: HOME | End: 2022-02-21
Attending: RADIOLOGY
Payer: COMMERCIAL

## 2022-02-21 PROCEDURE — 77412 RADIATION TX DELIVERY LVL 3: CPT | Performed by: RADIOLOGY

## 2022-02-21 NOTE — PROGRESS NOTES
DEPARTMENT OF RADIATION ONCOLOGY   ON TREATMENT VISIT       2/21/2022      NAME:  Lu Ramires    YOB: 1965    Diagnosis: left breast DCIS (ER/DE+, Her2-), s/p BCS, currently undergoing adjuvant XRT    SUBJECTIVE:   Lu Ramires has now received 3724 cGy in 14 fractions directed to the left breast for management of left breast DCIS. Patient has been applying hydrocortisone and Aquaphor to treatment area, especially inframammary fold d/t increased itching and burning pain. Denies relief and asking if there is anything else she should be doing differently. Patient follows with Dr Mitzi Bond at [facility] medical oncology. Planned to start tamoxifen post XRT completion. Next appt 6/17/22    Past medical, surgical, social and family histories reviewed and updated as indicated. Pain: +burning pain to left inframammary fold. ALLERGIES:  Patient has no known allergies. Current Outpatient Medications   Medication Sig Dispense Refill    silver sulfADIAZINE (SILVADENE) 1 % cream Apply topically 3 times daily. 50 g 3    magnesium 30 MG tablet Take 30 mg by mouth 2 times daily      b complex vitamins capsule Take 1 capsule by mouth daily      Cholecalciferol (VITAMIN D) 2000 units CAPS capsule Take by mouth      amLODIPine (NORVASC) 5 MG tablet Take 1 tablet by mouth daily 30 tablet 11    pantoprazole (PROTONIX) 40 MG tablet   Take 40 mg by mouth daily       levothyroxine (SYNTHROID) 50 MCG tablet   Take 50 mcg by mouth daily       metoprolol (LOPRESSOR) 100 MG tablet   Take 100 mg by mouth 2 times daily       spironolactone (ALDACTONE) 50 MG tablet   Take 50 mg by mouth daily        No current facility-administered medications for this encounter. OBJECTIVE:  Alert and fully ambulatory. Pleasant and conversant.         Wt Readings from Last 3 Encounters:   02/01/22 291 lb (132 kg)   01/21/22 280 lb (127 kg)   12/30/21 282 lb (127.9 kg)       Irradiated skin shows hyperpigmentation to left inframammary fold area. ASSESSMENT/PLAN:     Patient is tolerating treatments well with expected toxicities. Silvadene ordered to be applied TID to burned areas. May use any fragrance-free lotion in between Silvadene applications to help moisturized area. Discontinue OTC hydrocortisone cream.    Patient follows with Dr Channing Pineda at [facility] for medical oncology. Planned to start tamoxifen post XRT completion. Next appt 6/17/22    Current and planned dose reviewed. Goals of treatment and potential side effects were reviewed with the patient. Questions answered to apparent satisfaction. Treatments will continue as planned.       Libby Tidwell, MSN, RN, APRN-CNP  Nurse Practitioner for Radiation Oncology    [facility]) [facility]: [phone] ([facility]: [phone])  Delma Elias) [facility]: [phone] (BAL: [phone])  [facility]) [facility]:  [phone] ([facility]:  [phone])

## 2022-02-22 ENCOUNTER — HOSPITAL ENCOUNTER (OUTPATIENT)
Dept: RADIATION ONCOLOGY | Age: 57
Discharge: HOME | End: 2022-02-22
Attending: RADIOLOGY
Payer: COMMERCIAL

## 2022-02-22 VITALS
RESPIRATION RATE: 18 BRPM | HEART RATE: 64 BPM | BODY MASS INDEX: 44.76 KG/M2 | DIASTOLIC BLOOD PRESSURE: 76 MMHG | TEMPERATURE: 97.6 F | SYSTOLIC BLOOD PRESSURE: 134 MMHG | WEIGHT: 285.8 LBS | OXYGEN SATURATION: 97 %

## 2022-02-22 DIAGNOSIS — C50.919 MALIGNANT NEOPLASM OF FEMALE BREAST, UNSPECIFIED ESTROGEN RECEPTOR STATUS, UNSPECIFIED LATERALITY, UNSPECIFIED SITE OF BREAST (HCC): Primary | ICD-10-CM

## 2022-02-22 PROCEDURE — 77427 RADIATION TX MANAGEMENT X5: CPT | Performed by: RADIOLOGY

## 2022-02-22 PROCEDURE — 77336 RADIATION PHYSICS CONSULT: CPT | Performed by: RADIOLOGY

## 2022-02-22 PROCEDURE — 99999 PR OFFICE/OUTPT VISIT,PROCEDURE ONLY: CPT | Performed by: RADIOLOGY

## 2022-02-22 PROCEDURE — 77412 RADIATION TX DELIVERY LVL 3: CPT | Performed by: RADIOLOGY

## 2022-02-22 RX ORDER — ONDANSETRON 4 MG/1
4 TABLET, FILM COATED ORAL 3 TIMES DAILY PRN
Qty: 15 TABLET | Refills: 0 | Status: SHIPPED | OUTPATIENT
Start: 2022-02-22

## 2022-02-22 ASSESSMENT — PAIN DESCRIPTION - LOCATION: LOCATION: BREAST

## 2022-02-22 ASSESSMENT — PAIN DESCRIPTION - DESCRIPTORS: DESCRIPTORS: BURNING

## 2022-02-22 ASSESSMENT — PAIN DESCRIPTION - ORIENTATION: ORIENTATION: LEFT

## 2022-02-22 ASSESSMENT — PAIN SCALES - GENERAL: PAINLEVEL_OUTOF10: 7

## 2022-02-22 NOTE — PROGRESS NOTES
Radiation Oncology          -chart reviewed  -imaging reviewed  -cont RT        Les Meneses.  Deshaun Llamas MD [facility] [address] Oncology  Cell: [phone]    [facility]:  [phone]   FAX: [phone]  [facility]:  [address] [place] [address] Avenue:    [phone]  [address] [place] [address] Road:  [address] [place] [address] Road:  [phone]

## 2022-02-22 NOTE — PATIENT INSTRUCTIONS
Continue daily fractionated radiation therapy as scheduled. Please see weekly OTV note and intial consultation letter in [facility] for clinical details. Dano Matthew. Anthony Cruz MD MS [place]:  [phone]   FAX: [phone] [address] [facility]:  [phone]   FAX:    [phone] [address] [facility] Road:  [phone]   FAX:  [phone]  Email: [email or web address]

## 2022-02-22 NOTE — PROGRESS NOTES
Malcom Comes  2/22/2022  Wt Readings from Last 3 Encounters:   02/22/22 285 lb 12.8 oz (129.6 kg)   02/01/22 291 lb (132 kg)   01/21/22 280 lb (127 kg)     Body mass index is 44.76 kg/m². Treatment Area:CTV L breast    Patient was seen today for weekly visit. Comfort Alteration  KPS:90%  Fatigue: Mild    Nutritional Alteration  Anorexia: No   Nausea: Yes   Vomiting: No     Skin Alteration   Sensation:yes, red silvadenee ordered    Radiation Dermatitis:  Yes pt using hydrocortisone cream    Mucous Membrane Alteration  Drainage: No  Lymphedema: No    Emotional  Coping: effective    Sexuality Alteration  na    Injury, potential bleeding or infection: no        Lab Results   Component Value Date    WBC 4.6 12/13/2020     12/13/2020         /76   Pulse 64   Temp 97.6 °F (36.4 °C) (Temporal)   Resp 18   Wt 285 lb 12.8 oz (129.6 kg)   LMP 02/14/2013 (Approximate)   SpO2 97%   BMI 44.76 kg/m²   BP within normal range? yes       Assessment/Plan: Pt completed 18/21fx and 3990/5256cGy.      Jeimy Iglesias RN

## 2022-02-23 ENCOUNTER — HOSPITAL ENCOUNTER (OUTPATIENT)
Dept: RADIATION ONCOLOGY | Age: 57
Discharge: HOME | End: 2022-02-23
Attending: RADIOLOGY
Payer: COMMERCIAL

## 2022-02-23 PROCEDURE — 77412 RADIATION TX DELIVERY LVL 3: CPT | Performed by: RADIOLOGY

## 2022-02-24 ENCOUNTER — HOSPITAL ENCOUNTER (OUTPATIENT)
Dept: RADIATION ONCOLOGY | Age: 57
Discharge: HOME | End: 2022-02-24
Attending: RADIOLOGY
Payer: COMMERCIAL

## 2022-02-24 PROCEDURE — 77412 RADIATION TX DELIVERY LVL 3: CPT | Performed by: RADIOLOGY

## 2022-02-25 ENCOUNTER — HOSPITAL ENCOUNTER (OUTPATIENT)
Dept: RADIATION ONCOLOGY | Age: 57
Discharge: HOME | End: 2022-02-25
Attending: RADIOLOGY
Payer: COMMERCIAL

## 2022-02-25 PROCEDURE — 77417 THER RADIOLOGY PORT IMAGE(S): CPT | Performed by: RADIOLOGY

## 2022-02-25 PROCEDURE — 77412 RADIATION TX DELIVERY LVL 3: CPT | Performed by: RADIOLOGY

## 2022-02-28 ENCOUNTER — HOSPITAL ENCOUNTER (OUTPATIENT)
Dept: RADIATION ONCOLOGY | Age: 57
Discharge: HOME | End: 2022-02-28
Attending: RADIOLOGY
Payer: COMMERCIAL

## 2022-02-28 PROCEDURE — 77412 RADIATION TX DELIVERY LVL 3: CPT | Performed by: RADIOLOGY

## 2022-03-01 ENCOUNTER — APPOINTMENT (OUTPATIENT)
Dept: RADIATION ONCOLOGY | Age: 57
End: 2022-03-01
Attending: RADIOLOGY
Payer: COMMERCIAL

## 2022-03-01 ENCOUNTER — HOSPITAL ENCOUNTER (OUTPATIENT)
Dept: RADIATION ONCOLOGY | Age: 57
Discharge: HOME | End: 2022-03-01
Attending: RADIOLOGY
Payer: COMMERCIAL

## 2022-03-01 PROCEDURE — 77412 RADIATION TX DELIVERY LVL 3: CPT | Performed by: RADIOLOGY

## 2022-03-01 PROCEDURE — 77336 RADIATION PHYSICS CONSULT: CPT | Performed by: RADIOLOGY

## 2022-03-02 ENCOUNTER — HOSPITAL ENCOUNTER (OUTPATIENT)
Dept: RADIATION ONCOLOGY | Age: 57
Discharge: HOME | End: 2022-03-02
Attending: RADIOLOGY
Payer: COMMERCIAL

## 2022-03-02 PROCEDURE — 77412 RADIATION TX DELIVERY LVL 3: CPT | Performed by: RADIOLOGY

## 2022-03-22 ENCOUNTER — TELEPHONE (OUTPATIENT)
Dept: CASE MANAGEMENT | Age: 57
End: 2022-03-22

## 2022-03-22 NOTE — TELEPHONE ENCOUNTER
Prepared patient's Survivorship Care Plan and Treatment Summary for her follow up appointment with Sarah Ta CNP at [facility] on 4/1/2022. Copy sent to patient's PCP. Folder prepared with additional written literature. LUZ BensonW, RN, OCN  Oncology Nurse Navigator

## 2022-04-01 ENCOUNTER — HOSPITAL ENCOUNTER (OUTPATIENT)
Dept: RADIATION ONCOLOGY | Age: 57
Discharge: HOME | End: 2022-04-01
Attending: RADIOLOGY

## 2022-04-01 ENCOUNTER — TELEPHONE (OUTPATIENT)
Dept: CASE MANAGEMENT | Age: 57
End: 2022-04-01

## 2022-04-01 VITALS
DIASTOLIC BLOOD PRESSURE: 82 MMHG | HEART RATE: 65 BPM | RESPIRATION RATE: 18 BRPM | WEIGHT: 287.4 LBS | OXYGEN SATURATION: 95 % | SYSTOLIC BLOOD PRESSURE: 135 MMHG | TEMPERATURE: 97.2 F | BODY MASS INDEX: 45.01 KG/M2

## 2022-04-01 DIAGNOSIS — Z17.0 MALIGNANT NEOPLASM OF LOWER-OUTER QUADRANT OF LEFT BREAST OF FEMALE, ESTROGEN RECEPTOR POSITIVE (HCC): Primary | ICD-10-CM

## 2022-04-01 DIAGNOSIS — C50.512 MALIGNANT NEOPLASM OF LOWER-OUTER QUADRANT OF LEFT BREAST OF FEMALE, ESTROGEN RECEPTOR POSITIVE (HCC): Primary | ICD-10-CM

## 2022-04-01 PROCEDURE — 99999 PR OFFICE/OUTPT VISIT,PROCEDURE ONLY: CPT | Performed by: NURSE PRACTITIONER

## 2022-04-01 RX ORDER — MUPIROCIN CALCIUM 20 MG/G
CREAM TOPICAL
Qty: 30 G | Refills: 1 | Status: SHIPPED | OUTPATIENT
Start: 2022-04-01 | End: 2022-05-01

## 2022-04-01 ASSESSMENT — PAIN SCALES - GENERAL: PAINLEVEL_OUTOF10: 7

## 2022-04-01 ASSESSMENT — PAIN DESCRIPTION - DESCRIPTORS: DESCRIPTORS: BURNING

## 2022-04-01 ASSESSMENT — PAIN DESCRIPTION - FREQUENCY: FREQUENCY: INTERMITTENT

## 2022-04-01 ASSESSMENT — PAIN DESCRIPTION - ORIENTATION: ORIENTATION: LEFT

## 2022-04-01 ASSESSMENT — PAIN DESCRIPTION - LOCATION: LOCATION: BREAST

## 2022-04-01 ASSESSMENT — PAIN DESCRIPTION - PAIN TYPE: TYPE: ACUTE PAIN

## 2022-04-01 NOTE — TELEPHONE ENCOUNTER
Met with patient during her follow up appointment with Jarad Pascual CNP today. Patient completed her active treatment in March 2022 for her Stage I (T1mi, Nx, Mx) ER/IN+, HER2- left breast cancer. States that she is doing well besides a painful area still open under her left breast.  Jarad Pascual CNP ordered Bactroban three times a day. Follow up appointment with radiation therapy in 2 weeks to re-evaluate. Reports that her appetite is good and she is sleeping well. Upon inquiring, states that she did not start the Tamoxifen medication yet because she was wanting to feel better after her radiation therapy treatments. She is scheduled to see Dr. Facundo Sánchez and Dr. Renee Cheng at [facility] on 6/17/2022 for follow up. Reviewed that she should start the medication at least a month prior to those appointments for medication evaluation. Verbalizes understanding and states that she will. Provided support and encouragement. Instructed patient in detail on her Cancer Treatment Summary and Survivorship Care Plan. Provided with a written copy. Aware that a copy will be sent to her PCP as well. Provided written copies of Patient Resource Booklet: Cancer Survivorship, Nutrition Following Cancer Treatment: Oncology Nutrition Guide, [facility] support group pamphlet, and Thriving and Surviving Post-Cancer Treatment: Nutritional and Emotional Support Services packet. Patient aware of mammogram due annually and at least 6 months after radiation therapy completion. Instructed patient to call with any questions or concerns. Verbally agreed. Patient appreciative of visit and information. LUZ LovelaceW, RN, OCN  Oncology Nurse Navigator

## 2022-04-01 NOTE — PROGRESS NOTES
Patient states she has not started on Tamoxifen to date. FALLS RISK SCREENING ASSESSMENT    Instructions:  Assess the patient and enter the appropriate indicators that are present for fall risk identification. Total the numbers entered and assign a fall risk score from Table 2.  Reassess patient at a minimum every 12 weeks or with status change. Assessment   Date  4/1/2022     1. Mental Ability: confusion/cognitively impaired No - 0       2. Elimination Issues: incontinence, frequency Yes - 3       3. Ambulatory: use of assistive devices (walker, cane, off-loading devices), attached to equipment (IV pole, oxygen) No - 0     4. Sensory Limitations: dizziness, vertigo, impaired vision Yes - 3       5. Age Less than 65 years - 0       6. Medication: diuretics, strong analgesics, hypnotics, sedatives, antihypertensive agents   Yes - 3   7. Falls:  recent history of falls within the last 3 months (not to include slipping or tripping)   No - 0   TOTAL 9    If score of 4 or greater was education given? Yes       TABLE 2   Risk Score Risk Level Plan of Care   0-3 Little or  No Risk 1. Provide assistance as indicated for ambulation activities  2. Reorient confused/cognitively impaired patient  3. Call-light/bell within patient's reach  4. Chair/bed in low position, stretcher/bed with siderails up except when performing patient care activities  5. Educate patient/family/caregiver on falls prevention  6.  Reassess in 12 weeks or with any noted change in patient condition which places them at a risk for a fall   4-6 Moderate Risk 1. Provide assistance as indicated for ambulation activities  2. Reorient confused/cognitively impaired patient  3. Call-light/bell within patient's reach  4. Chair/bed in low position, stretcher/bed with siderails up except when performing patient care activities  5. Educate patient/family/caregiver on falls prevention  6.   Falls risk precaution (Yellow sticker Level II) placed on patient chart   7 or   Higher High Risk 1. Place patient in easily observable treatment room  2. Patient attended at all times by family member or staff  3. Provide assistance as indicated for ambulation activities  4. Reorient confused/cognitively impaired patient  5. Call-light/bell within patient's reach  6. Chair/bed in low position, stretcher/bed with siderails up except when performing patient care activities  7. Educate patient/family/caregiver on falls prevention  8. Falls risk precaution (Yellow sticker Level III) placed on patient chart           MALNUTRITION RISK SCREENING ASSESSMENT    4/1/2022   Patient: Renetta Del Rosario  Sex:  female    Instructions:  Assess the patient and enter the appropriate indicators that are present for nutrition risk identification. Total the numbers entered and assign a risk score. Follow the appropriate action for total score listed below. Assessment   Date  4/1/2022     1. Have you lost weight without trying? 0- No     2. Have you been eating poorly because of a decreased appetite? 0- No   3. Do you have a diagnosis of head and neck cancer?       0- No                                                                                    TOTAL 0          Score of 0-1: No action  Score 2 or greater:  · For Non-Diabetic Patient: Recommend adding Ensure Complete 2 x daily and provide patient with Ensure wellness bag with coupons  · For Diabetic Patient: Recommend adding Glucerna Shake 2 x daily and provide patient with Glucerna Wellness bag with coupons  · Route to the dietitian via Asher Tavera RN

## 2022-04-01 NOTE — PROGRESS NOTES
RADIATION ONCOLOGY  4 week follow up         4/1/22    NAME:  Aleks Longoria OF BIRTH:  1965    CC: Pt returns today for re-assessment of radiation treatment area. HPI: Marian Stratton is a pleasant 65 y.o. female with a diagnosis of left breast cancer (ER/[place]+, Her2-), s/p BCS and adjuvant XRT. The patient underwent a course of radiation therapy to the left breast, which was completed on 3/2/22. She completed 5256 cGy in 21 fractions. States that she has healed well but she notes one spot under her left breast that is still painful despite applying neosporin and using hydrogel dressings. Asking for skin to be assessed today to determine if everything is healing well. Pt is following with Dr Gege Perez for medical oncology. Has not started Tamoxifen yet as she felt that she needs her breast to quit hurting first before introducing something else. Next appt 6/17/22. Pain: Pain to left breast treatment area    Past medical, surgical, social and family histories reviewed and updated as indicated. ALLERGIES:  Patient has no known allergies. Current Outpatient Medications   Medication Sig Dispense Refill    mupirocin (BACTROBAN) 2 % cream Apply 3 times daily. 30 g 1    ondansetron (ZOFRAN) 4 MG tablet Take 1 tablet by mouth 3 times daily as needed for Nausea or Vomiting 15 tablet 0    silver sulfADIAZINE (SILVADENE) 1 % cream Apply topically 3 times daily.  50 g 3    magnesium 30 MG tablet Take 30 mg by mouth 2 times daily      b complex vitamins capsule Take 1 capsule by mouth daily      Cholecalciferol (VITAMIN D) 2000 units CAPS capsule Take by mouth      amLODIPine (NORVASC) 5 MG tablet Take 1 tablet by mouth daily 30 tablet 11    pantoprazole (PROTONIX) 40 MG tablet   Take 40 mg by mouth daily       levothyroxine (SYNTHROID) 50 MCG tablet   Take 50 mcg by mouth daily       metoprolol (LOPRESSOR) 100 MG tablet   Take 100 mg by mouth 2 times daily       spironolactone (ALDACTONE) 50 MG tablet   Take 50 mg by mouth daily        No current facility-administered medications for this encounter. Physical Examination:   Vitals:    04/01/22 1444   BP: 135/82   Pulse: 65   Resp: 18   Temp: 97.2 °F (36.2 °C)   TempSrc: Temporal   SpO2: 95%   Weight: 287 lb 6.4 oz (130.4 kg)       Wt Readings from Last 3 Encounters:   04/01/22 287 lb 6.4 oz (130.4 kg)   02/22/22 285 lb 12.8 oz (129.6 kg)   02/01/22 291 lb (132 kg)       Alert and fully ambulatory. Pleasant and conversant. Irradiated skin shows mild hyperpigmentation and possibly open blister to left inframammary fold. No drainage or odor noted. HR reg, rhythm reg. Lungs CTA. ASSESSMENT/PLAN:     Left breast cancer (ER/[place]+, Her2-), s/p BCS and adjuvant radiation therapy to the left breast completed on 3/2/22 (5256 cGy in 21 fractions). Patient is doing well post-radiation completion. Skin care and sun care reviewed. Signs and symptoms of recurrence reviewed. Encouraged monthly self breast exams. Imaging per med onc. Left breast radiation dermatitis: States that she has healed well but she notes one spot under her left breast that is still painful despite applying neosporin and using hydrogel dressings. Skin breakdown noted to left inframammary fold, possibly blister that has opened and now healing. Painful to touch. No heat/swelling or drainage noted. Bactroban ointment ordered to be applied 3 times a day. Patient instructed to keep area as dry and open to air after ointment application as possible (use a little bit for each application, do not use copious amts) -- no more hydrogel dressings. If worsening skin noted or drainage develops, or warmth/redness/swelling/fever, etc -- patient to be seen sooner in follow up for evaluation. Cont to follow with Dr Daphnie Del Rosario for medical oncology.  Has not started Tamoxifen yet as she felt that she needs her breast to quit hurting first before introducing something else. Instructed to start Tamoxifen as instructed to decrease cancer risk of recurrence and to be able to discuss any possible side effects at next follow up visit with med onc. Patient verbalized understanding. Next appt 6/17/22. I discussed follow up plans with Tri Johnson. At this time, radiation oncollgy will see the patient back in 2 weeks for a post-radiation completion follow-up visit unless patient needs to be seen sooner for worsening skin issue. Tri Johnson is to follow up with other providers involved in their care as directed (including but not limited to Medical Oncology, Primary Care, Pulmonary, and Surgery). The patient was given our contact number in the event that if at any time they change their mind and would like to return to the clinic to see either myself or one of the Radiation Oncologists, they can simply call us and we would be happy to see them sooner. Thank you for involving us in the management of this extremely pleasant patient. More than 15 min was in direct contact with pt coordinating/giving care. >50% of the visit was spent in counseling the pt on the following: Follow up care    The nurses notes were reviewed and incorporated into this assessment and plan. Questions answered to apparent satisfaction.       Ceferino Kirkland, MSN, RN, APRN-CNP  Nurse Practitioner for Radiation Oncology    [facility] [address] [place]) [facility]: [phone] (URJ: [phone])  [address] [place] [address]: [phone] (ZAB: [phone])  [address] [facility]:  [phone] (DYY:  [phone])

## 2022-04-15 ENCOUNTER — HOSPITAL ENCOUNTER (OUTPATIENT)
Dept: RADIATION ONCOLOGY | Age: 57
Discharge: HOME | End: 2022-04-15
Attending: RADIOLOGY
Payer: COMMERCIAL

## 2022-04-15 VITALS
TEMPERATURE: 97 F | BODY MASS INDEX: 45.55 KG/M2 | SYSTOLIC BLOOD PRESSURE: 129 MMHG | WEIGHT: 290.8 LBS | RESPIRATION RATE: 18 BRPM | HEART RATE: 65 BPM | OXYGEN SATURATION: 96 % | DIASTOLIC BLOOD PRESSURE: 77 MMHG

## 2022-04-15 PROCEDURE — 99212 OFFICE O/P EST SF 10 MIN: CPT | Performed by: RADIOLOGY

## 2022-04-15 NOTE — PROGRESS NOTES
DEPARTMENT OF RADIATION ONCOLOGY   Follow up visit        4/15/2022    NAME:  Casandra Perez    :  1965 65 y.o. female     PCP: Louis Burris DO    REFERRING PROVIDER: Glenna Chicas    DIAGNOSIS:    Carcinoma of left breast S/P surgical staging and radiation therapy    STAGING: Cancer Staging    [id number]  1A    RECENT HISTORY: Casandra Perez returns for a post radiation treatment follow up visit. Treatments were completed on 3/2/2022     Past medical, surgical, social and family histories reviewed and updated as indicated. ALLERGIES:  Patient has no known allergies. MEDICATIONS:   Current Outpatient Medications:     mupirocin (BACTROBAN) 2 % cream, Apply 3 times daily. , Disp: 30 g, Rfl: 1    ondansetron (ZOFRAN) 4 MG tablet, Take 1 tablet by mouth 3 times daily as needed for Nausea or Vomiting, Disp: 15 tablet, Rfl: 0    silver sulfADIAZINE (SILVADENE) 1 % cream, Apply topically 3 times daily. , Disp: 50 g, Rfl: 3    magnesium 30 MG tablet, Take 30 mg by mouth 2 times daily, Disp: , Rfl:     b complex vitamins capsule, Take 1 capsule by mouth daily, Disp: , Rfl:     Cholecalciferol (VITAMIN D) 2000 units CAPS capsule, Take by mouth, Disp: , Rfl:     amLODIPine (NORVASC) 5 MG tablet, Take 1 tablet by mouth daily, Disp: 30 tablet, Rfl: 11    pantoprazole (PROTONIX) 40 MG tablet,  Take 40 mg by mouth daily , Disp: , Rfl:     levothyroxine (SYNTHROID) 50 MCG tablet,  Take 50 mcg by mouth daily , Disp: , Rfl:     metoprolol (LOPRESSOR) 100 MG tablet,  Take 100 mg by mouth 2 times daily , Disp: , Rfl:     spironolactone (ALDACTONE) 50 MG tablet,  Take 50 mg by mouth daily , Disp: , Rfl:     REVIEW OF SYSTEMS:     Obtained from the patient, chart review and nursing assessment. 12 system review negative except left breast inframammary fold  PHYSICAL EXAMINATION:    There were no vitals filed for this visit.     Wt Readings from Last 3 Encounters:   22 287 lb 6.4 oz (130.4 kg) 02/22/22 285 lb 12.8 oz (129.6 kg)   02/01/22 291 lb (132 kg)       [facility] PERFORMACE STATUS:    100%  Constitutional: A well developed, well nourished 65 y.o. female who is alert, oriented, cooperative and in no apparent distress. EENT: EOMI NOEL. MMM. No icterus. No conjunctival injections. External canals are patent and no discharge was appreciated. Septum was midline, mucosa was without erythema, exudates or cobblestoning. Neck: Supple without thyromegaly. No elevated JVP. Trachea was midline. No carotid bruits. No stridor or subglottic wheeze. Respiratory: Chest was symmetrical without dullness to percussion. Breath sounds bilaterally were clear to auscultation. No wheezes, rhonchi or rales. Breasts: Left breast has surgical changes and smaller than the right breast.Left breast has surgical changes. Radiation skin reaction has completely healed including the inframammary fold  There is no nipple inversion  There are no palpable masses on examination of both breasts  Cardiovascular: Nonpalpable PMI. Regular without murmur, clicks, gallops or rubs. Abdomen: Obese, rounded and soft without organomegaly. No rebound, guarding or  rigidity. Lymphatic: No lymphadenopathy. Musculoskeletal: Ambulates without assistance. Normal curvature of the spine. No gross muscle weakness. Muscle size, tone and strength are normal. No involuntary movements. Extremities:  No lower extremity edema, ulcerations, tenderness, varicosities or erythema. Coordination appears adequate. Sensory function appears intact. Skin:  Warm and dry. Examination of color, turgor and pigmentation was relatively normal. No bruises or skin rashes. Neurological/Psychiatric: General behavior, level of consciousness, thought content is normal. The patient's emotional status is normal.  Cranial nerves II-XII are grossly intact.       TUMOR MARKERS: No results found for: PSA, CEA, , ST1203,     ASSESSMENT/PLAN: Patient is wearing a bra causing significant discomfort and pressure in the inframammary fold  Discouraged tight garments  Strongly encouraged aeration of the inframammary fold  She was also encouraged to start using the left upper extremity more than the right upper extremity. Patient has not started the hormonal treatment and she was encouraged to start it as soon as possible as she has completed the radiation treatments about 5 weeks ago  She has an appointment to see her primary oncologist at [facility] in June  [facility] as needed . I asked Payton Monreal  to contact us at any time for any questions or concerns. Thank you for the opportunity to participate in multidisciplinary management of this remarkable and pleasant patient.      LUIZA Serna    Department of Radiation Oncology  [facility]) [facility]: [phone] ([facility]: [phone])  [address] [place] [address]: [phone] ([facility]: [phone])  [address] [facility]) [facility]:  [phone] ([facility]:  [phone])

## 2022-04-15 NOTE — PROGRESS NOTES
Aurelio Rodriguez  4/15/2022  2:42 PM      Vitals:    04/15/22 1440   BP: 129/77   Pulse: 65   Resp: 18   Temp: 97 °F (36.1 °C)   SpO2: 96%    : Wt Readings from Last 3 Encounters:   04/15/22 290 lb 12.8 oz (131.9 kg)   04/01/22 287 lb 6.4 oz (130.4 kg)   02/22/22 285 lb 12.8 oz (129.6 kg)                Current Outpatient Medications:     mupirocin (BACTROBAN) 2 % cream, Apply 3 times daily. , Disp: 30 g, Rfl: 1    ondansetron (ZOFRAN) 4 MG tablet, Take 1 tablet by mouth 3 times daily as needed for Nausea or Vomiting, Disp: 15 tablet, Rfl: 0    silver sulfADIAZINE (SILVADENE) 1 % cream, Apply topically 3 times daily. , Disp: 50 g, Rfl: 3    magnesium 30 MG tablet, Take 30 mg by mouth 2 times daily, Disp: , Rfl:     b complex vitamins capsule, Take 1 capsule by mouth daily, Disp: , Rfl:     Cholecalciferol (VITAMIN D) 2000 units CAPS capsule, Take by mouth, Disp: , Rfl:     amLODIPine (NORVASC) 5 MG tablet, Take 1 tablet by mouth daily, Disp: 30 tablet, Rfl: 11    pantoprazole (PROTONIX) 40 MG tablet,  Take 40 mg by mouth daily , Disp: , Rfl:     levothyroxine (SYNTHROID) 50 MCG tablet,  Take 50 mcg by mouth daily , Disp: , Rfl:     metoprolol (LOPRESSOR) 100 MG tablet,  Take 100 mg by mouth 2 times daily , Disp: , Rfl:     spironolactone (ALDACTONE) 50 MG tablet,  Take 50 mg by mouth daily , Disp: , Rfl:       Patient is seen today in follow up with Dr. Shalonda Hernandez, for RT of CTV Left Breast +tb from 1/31/22-3/2/22 completing 21fx/5256cGy. Patient states her skin on left breast under her arm, along incision area is painful and still burning her at times, but not at this moment. Patient states she is using Bactroban ointment for 2 weeks now and states her skin is improving, but not completely healed at this time. Patient following Dr. Lida Arenas for Med Onc at [facility], she believes she last seen in January and has her next appointment 6/17/22. Patient states she has not started on Tamoxifen to date.  No other questions or concerns at this time. FALLS RISK SCREENING ASSESSMENT    Instructions:  Assess the patient and enter the appropriate indicators that are present for fall risk identification. Total the numbers entered and assign a fall risk score from Table 2.  Reassess patient at a minimum every 12 weeks or with status change. Assessment   Date  4/15/2022     1. Mental Ability: confusion/cognitively impaired No - 0       2. Elimination Issues: incontinence, frequency Yes - 3       3. Ambulatory: use of assistive devices (walker, cane, off-loading devices), attached to equipment (IV pole, oxygen) No - 0     4. Sensory Limitations: dizziness, vertigo, impaired vision Yes - 3       5. Age Less than 65 years - 0       6. Medication: diuretics, strong analgesics, hypnotics, sedatives, antihypertensive agents   Yes - 3   7. Falls:  recent history of falls within the last 3 months (not to include slipping or tripping)   No - 0   TOTAL 9    If score of 4 or greater was education given? Yes       TABLE 2   Risk Score Risk Level Plan of Care   0-3 Little or  No Risk 1. Provide assistance as indicated for ambulation activities  2. Reorient confused/cognitively impaired patient  3. Call-light/bell within patient's reach  4. Chair/bed in low position, stretcher/bed with siderails up except when performing patient care activities  5. Educate patient/family/caregiver on falls prevention  6.  Reassess in 12 weeks or with any noted change in patient condition which places them at a risk for a fall   4-6 Moderate Risk 1. Provide assistance as indicated for ambulation activities  2. Reorient confused/cognitively impaired patient  3. Call-light/bell within patient's reach  4. Chair/bed in low position, stretcher/bed with siderails up except when performing patient care activities  5. Educate patient/family/caregiver on falls prevention  6.   Falls risk precaution (Yellow sticker Level II) placed on patient chart   7 or   Higher High Risk 1. Place patient in easily observable treatment room  2. Patient attended at all times by family member or staff  3. Provide assistance as indicated for ambulation activities  4. Reorient confused/cognitively impaired patient  5. Call-light/bell within patient's reach  6. Chair/bed in low position, stretcher/bed with siderails up except when performing patient care activities  7. Educate patient/family/caregiver on falls prevention  8. Falls risk precaution (Yellow sticker Level III) placed on patient chart           MALNUTRITION RISK SCREENING ASSESSMENT    4/15/2022   Patient: Renetta Del Rosario  Sex:  female    Instructions:  Assess the patient and enter the appropriate indicators that are present for nutrition risk identification. Total the numbers entered and assign a risk score. Follow the appropriate action for total score listed below. Assessment   Date  4/15/2022     1. Have you lost weight without trying? 0- No     2. Have you been eating poorly because of a decreased appetite? 0- No   3. Do you have a diagnosis of head and neck cancer?       0- No                                                                                    TOTAL 0          Score of 0-1: No action  Score 2 or greater:  · For Non-Diabetic Patient: Recommend adding Ensure Complete 2 x daily and provide patient with Ensure wellness bag with coupons  · For Diabetic Patient: Recommend adding Glucerna Shake 2 x daily and provide patient with Glucerna Wellness bag with coupons  · Route to the dietitian via Asher Tavera RN

## 2023-01-18 ENCOUNTER — HOSPITAL ENCOUNTER (OUTPATIENT)
Dept: OCCUPATIONAL THERAPY | Age: 58
Setting detail: THERAPIES SERIES
Discharge: HOME | End: 2023-01-18
Payer: COMMERCIAL

## 2023-01-18 PROCEDURE — 97530 THERAPEUTIC ACTIVITIES: CPT | Performed by: OCCUPATIONAL THERAPIST

## 2023-01-18 PROCEDURE — 97165 OT EVAL LOW COMPLEX 30 MIN: CPT | Performed by: OCCUPATIONAL THERAPIST

## 2023-01-18 NOTE — PROGRESS NOTES
Occupational Therapy  OCCUPATIONAL THERAPY INITIAL [address]  [address]              Phone: [phone]             Fax: [phone]       Date:  2023  Initial Evaluation Date: 2023       Evaluating Therapist: PETE Holm/EMMY OMER    Patient Name:  Casandra Perez    :  1965    Restrictions/Precautions:  Left breast, fall risk, BMI 45.55  Diagnosis:  Ductal carcinoma in situ (DCIS) of left breast with microinvasive component ([facility] [address] [C50.912]       Date of Surgery/Injury: CTV Left Breast +tb from 22-3/2/22 completing [id number]    Insurance/Certification information:  [place] blue access PPO  Plan of care signed (Y/N): N  Visit# / total visits: evaluation    Referring Practitioner/NPI#:  NA  Specific Practitioner Orders: Evaluation and treatment    Assessment of current deficits   [x]Pain  []Skin Integrity   [x]Lymphedema   []Functional transfers/mobility   []ADLs   []Strength    []Cognition  []IADLs   []Safety Awareness   []  Motor Endurance    []Fine Motor Coordination   []Balance   []Vision/perception  []Sensation []Gross Motor Coordination  [x]ROM     OT PLAN OF CARE   OT POC based on physician orders, patient diagnosis and results of clinical assessment    Frequency/Duration: 1-2x/week for 12 treatment sessions from 23 through 23   Projected units Requested:48  Approved days/units: 25 visits    Specific OT Treatment to include:     Plan of Care: 09247, 13521, 55561, 72002    [x]97140-Manual Lymph Drainage and Combined Decongestive Therapy  [x]65587- Therapeutic Exercise/ HEP including Education  [L]61674- Skin Care Education/ADLs/self-care/bandaging  [x]62344-[employer] Multilayer Short Stretch Compression Bandaging/ Self MLD  [x]Education for Lymphedema Risks/Precautions     [x] Caregiver Education    []other:      Patient Specific Goal: Decreased pain and swelling of left breast      STG: 3 weeks  1-Patient will demonstrate good knowledge and understanding for lymphedema precautions, skin care and self-management to decrease progression of lymphedema and risk of infection. : Pt presents with fair- understanding of lymphedema treatment and/or precautions, skin care or infection prevention. Provided and reviewed handouts on risk reduction, travel, therapist training, exercise, breast cancer related lymphedema screening and treatments. Therapist continued patient education regarding the above for safety and for self management. Patient able to verbalize understanding. Patient progressing toward goal.    2-Patient will present with decreased limb volume in the involved extremity from moderate to min for improved indep in client centered tasks. 3-Patient will demonstrate compliance with compression therapy for independent management of lymphedema. 4-Pt will perform HEP with min assistance to reduce swelling and improve ROM in left upper extremity flex/abduction to 160° degrees for participation in ADLs and client centered tasks. LT weeks  1-Patient will be independent with self-management of lymphedema including understanding garment wear schedule, self-compression bandaging, HEP and self-care. 2-Patient will be fitted for appropriate compression garments for long term management of lymphedema. 3- Patient will present with decreased limb volume in the involved extremity from min to trace  for improved indep in client centered tasks.         Past Medical History:   Past Medical History:   Diagnosis Date    Cancer ([address]     L breast     Degenerative arthritis     of lower spine    GERD (gastroesophageal reflux disease)     Headache(784.0)     Hypertension     Hypothyroidism     Malignant neoplasm of lower-outer quadrant of breast of female, estrogen receptor positive ([address] 2022    Obesity     Osteoarthritis      Past Surgical History:   Past Surgical History:   Procedure Laterality Date CHOLECYSTECTOMY      COLONOSCOPY      CYST REMOVAL      from hand    ENDOSCOPY, COLON, DIAGNOSTIC      TONSILLECTOMY      TUBAL LIGATION         []Lymph node removal: none  [x]Radiation Therapy: yes-burning, scarring  []Chemotherapy: none  []History of Cellulitis/Infection: none  []Family history of lymphedema: none  []Previous treatment for lymphedema: yes-[place]-they completed massage and she purchased 2 bras out of pocket. They did not teach her the massage or educate her on lymphedema   []Current compression garment use: 2 compression bras that she wears occasionally    Reason for Referral: Pt was referred to [facility] due to intractable lymphedema of the left breast, unrelieved by compression.   Chief Complaint: pain, thickness of lateral left breast  Triggers: working    Home Living: alone, apartment with no steps to enter, no basement, walk in shower without DME  Prior Level of Function: indep    Cognition:   Alert/Oriented x3     IADL History  Homemaking Responsibility: indep  Shopping Responsibility: indep  Mode of Transportation: indep  Leisure & Hobbies: music, movies, concerts  Work: Sunday-Thursday 2-10 at [employer]     Pain Level: like a \"pulled muscle\" to her left breast and flank; moderate, aching, heaviness, throbbing, tight (pulling), and uncomfortable  Pitting Edema: left lateral breast  Fibrotic tissue:  left lateral breast, inferior breast and flank  Skin Integrity:   [x]Hyperplasia   []Hyperkeratosis  []Hyperpigmentation  []Papillomas  []Lymphorrhea      Vision: glasses        Hearing: WFL     ADL  Feeding:  indep  Grooming:  indep  Bathing:  indep  UE Dressing:  indep  LE Dressing:  indep  Toileting:  indep  Transfer:  indep    UE ASSESSMENT:   Hand Dominance is right  ROM: WFL overall  Right: Internal Rotation:WFL   External Rotation:WFL  Left: Internal Rotation:WFL   External Rotation:limitation with discomfort  Strength generally:4+/5    Sensation: tingling in hands and feet    Lymphedema Measurements:  Lymphedema Upper Extremity Measurements    Measurements are made in 4cm increments and are circumferential.    CM Follow up #4  Left -  Follow up #3  Left -  Follow up #2  Left -  Follow up #1  Left -  Eval Left  1/18/2023    Follow up #4  Right -  Follow up #3  Right -  Follow up #2  Right -  Follow up #1 Right -  Eval Right    1/18/2023    palm     21     20   wrist     17.5     16.5   4cm     18.5     19   8cm     20     22   12cm     22.5     25.25   16cm     27.25     28.75   20cm     29.5     31.25   24cm     29.25     30.25   28cm     35     34.5   32cm     41.25     39   36cm     42     41.5   40cm     46.25     44.25   44cm     47.5     45.75   Chest     130.5        Abdominals     131.5          Fingers are measured in cm and between mp and pip.   Digit Follow up #4  Left -  Follow up #3  Left -  Follow up #2  Left -  Follow up #1  Left -  Evaluation  Left -   1/18/2023  Follow up #4  Right -  Follow up #3  Right -  Follow up #2  Right -  Follow up #1  Right -  Evaluation Right -   1/18/2023    First Digit     6.5     6.25   Second Digit     6.5     6.25   Third Digit     6     6   Fourth Digit     6     5.75   Fifth Digit     5.75     5.5     ROM #1: 1/18/2023   R flex: 165°  ABD:132°            L flex: 137°       ABD:150° ROM #2:   R flex:   ABD:            L flex:         ABD:   ROM #3:   R flex:   ABD:            L flex:         ABD: ROM #4:   R flex:   ABD:            L flex:         ABD:     Functional Outcome Measurements:  -Lymphedema Life Impact Scale Score:  34  Percent Impairment:  47%    -Functional Assessment of Chronic Illness Therapy Fatigue Scale Version 4:37/52; moderate fatigue     Eval Complexity: Low  Profile and History- Min  Assessment of Occupational Performance and Identification of Deficits- 1-3   Clinical Decision Making- min    Rehab Potential:                                 [x] Good  [] Fair  [] Poor        Suggested Professional Referral:       [x] No  [] Yes:  Barriers to Goal Achievement:          [x] No  [] Yes:  Domestic Concerns:                           [x] No  [] Yes:       Patient. Education:  [x] Plans/Goals, Risks/Benefits discussed  [] Home exercise program  Method of Education: [x] Verbal  [] Demo  [x] Written  Comprehension of Education:  [x] Verbalizes understanding. [] Demonstrates understanding. [x] Needs Review. [] Demonstrates/verbalizes understanding of HEP/Ed previously given. Patient understands diagnosis/prognosis and consents to treatment, plan and goals: [x] Yes    [] No   This patient demonstrates the ability to follow the plan of care and progress towards goals. Rehab potential is good       Assessment: Secondary Lymphedema, Stage 2, Affecting the left breast and upper Extremity. Patient will benefit from a Complete Decongestive Therapy protocol using manual lymphatic drainage techniques, skilled multilayer compression bandaging using short stretch bandages and foam padding, instruction in a home program of self-massage and exercise, compression garment fitting and instruction in independent management strategies for lymphedema.            Goal Formulation: Patient  Time In: 1200            Time Out: 1302                      Timed Code Treatment Minutes: 62 minutes      CODE  Minutes  Units   21333 OT Eval Low 45 1   56982 OT Eval Medium     77064 OT Eval High     05736 Fluidotherapy     26098 Manual     65042 Therapeutic Ex     25333 Therapeutic Activity 17 1   41372 ADL/COMP Tech Train     [id number] Neuromuscular Re-Ed     21142 OrthoManagementTraining     [id number] Paraffin     34375 Electrical Stim - Attended     [id number] Iontophoresis     06416 Ultrasound      Other     Total  62 2        Electronically signed by: Dolores Dillard, OT R/L, CLT     Physician's Certification / Comments      Frequency/Duration 1-2x/week for 12 treatment sessions from 1/18/23 through 4/12/23      I have reviewed the Plan of Care established for skilled therapy services and certify that the services are required and that they will be provided while the patient is under my care.     Physician's Comments/Revisions:                   Physicians's Printed Name:  Radha Fuentes MD                                   Physician's Signature:                                                               Date:      Please review Patient's OT evaluation and if you agree sign/date and fax back to us at our [facility]  [facility] OT fax [phone]. Thank you for your referral!

## 2023-01-24 ENCOUNTER — HOSPITAL ENCOUNTER (OUTPATIENT)
Dept: OCCUPATIONAL THERAPY | Age: 58
Setting detail: THERAPIES SERIES
Discharge: HOME | End: 2023-01-24
Payer: COMMERCIAL

## 2023-01-24 PROCEDURE — 97140 MANUAL THERAPY 1/> REGIONS: CPT

## 2023-01-24 PROCEDURE — 97530 THERAPEUTIC ACTIVITIES: CPT

## 2023-01-24 NOTE — PROGRESS NOTES
Occupational Therapy      OCCUPATIONAL THERAPY PROGRESS NOTE  Phone: [phone]  Fax: [phone]     Date:  2023  Initial Evaluation Date: 2023                  Evaluating Therapist: PETE Acosta/EMMY OMER     Patient Name:  Remington Modi                :  1965     Restrictions/Precautions:  Left breast, fall risk, BMI 45.55  Diagnosis:  Ductal carcinoma in situ (DCIS) of left breast with microinvasive component ([facility] [address] [C50.912]                                                          Date of Surgery/Injury: CTV Left Breast +tb from 22-3/2/22 completing [id number]     Insurance/Certification information:  [place] blue access PPO  Plan of care signed (Y/N): N  Visit# / total visits: Evaluation + 1 treatment    Referring Practitioner/NPI#:  NA  Specific Practitioner Orders: Evaluation and treatment    Assessment of current deficits   [x]Pain  []Skin Integrity   [x]Lymphedema   []Functional transfers/mobility   []ADLs   []Strength    []Cognition  []IADLs   []Safety Awareness   []  Motor Endurance    []Fine Motor Coordination   []Balance   []Vision/perception  []Sensation []Gross Motor Coordination  [x]ROM     OT PLAN OF CARE   OT POC based on physician orders, patient diagnosis and results of clinical assessment    Frequency/Duration: 1-2x/week for 12 treatment sessions from 23 through 23   Projected units Requested:48  Approved days/units: 25 visits    Specific OT Treatment to include: 46196, 29636, 30838, 60187  Plan of Care:     [x]97140-Manual Lymph Drainage and Combined Decongestive Therapy  [x]28650- Skilled Multilayer Short Stretch Compression Bandaging/ Therapeutic Exercise  [x]Skin Care Education [x]HEP including Self MLD Education and/or Self Bandaging  [x]Education for Lymphedema Risks/Precautions     [x] Caregiver Education   []other:      Patient Specific Goal: Decreased pain and swelling of left breast    1-Patient will demonstrate good knowledge and understanding for lymphedema precautions, skin care and self-management to decrease progression of lymphedema and risk of infection. : Therapist educated patient on improving skin care and attempting to reduce sodium throughout the day. Will continue to work with patient to increase education on lymphedema and improve proper care. 2-Patient will present with decreased limb volume in the involved extremity from moderate to min for improved indep in client centered tasks. : Therapist completed the manual lymphatic massage to left upper arm and breast with education provided on each step as the step was being performed. Patient showing confusion through session however after increased education patient felt much better upon leaving session. Patient also stated she felt some reduction of discomfort and heaviness. Will continue to work with patient to increase understanding and reduce swelling through areas on left side. 3-Patient will demonstrate compliance with compression therapy for independent management of lymphedema. : Information sent to DME to fit and order a compression bra for patient. Will continue to work with DME to obtain proper compression. 4-Pt will perform HEP with min assistance to reduce swelling and improve ROM in left upper extremity flex/abduction to 160° degrees for participation in ADLs and client centered tasks. : Educated and performed ROM exercises and manipulation to left axilla and flank to increase ROM and reduce feeling of cording. Will provide patient with exercises to complete at home to improve ROM and reduce swelling through left side. LT weeks  1-Patient will be independent with self-management of lymphedema including understanding garment wear schedule, self-compression bandaging, HEP and self-care. Working towards goal.   2-Patient will be fitted for appropriate compression garments for long term management of lymphedema.   Fax sent to DME for patient to become properly fitted for a compression bra and possible arm sleeve. 3- Patient will present with decreased limb volume in the involved extremity from min to trace  for improved indep in client centered tasks. Will continue to work towards goal.        Restrictions/Precautions:  [] No blood pressure/blood draw in: [x] Left Upper Extremity     [] Right Upper Extremity        [] Fall Risk       Intervention:   []Other    Pain:  [] No    [x] Yes  Location: Left upper body  Pain Rating (0-10 pain scale):   Pain Description: moderate, aching, heaviness, throbbing, tight (pulling), and uncomfortable  Interruption of Treatment [] Yes  [x] No    Came to Clinic:  [] Bandaged    []Unbandaged    [] With Stocking     [] With Sleeve     [] Kinesiotaped    [] [place]    [] With Alternative Compression:    Skin Integrity:  [] Normal [] Dry  []Rough []Moist []Rash  Location of problem area/s:  [] Wound: Location/Description   [x] Fibrosis: Location/Description: left lateral breast, inferior breast and flank  [] Keratosis: Location/Description  [] Papillomas: Location/Description  [x] Hyperplasia   []Hyperkeratosis  [x]Hyperpigmentation: Left breast and beneath the breast.   []Papillomas  []Lymphorrhea  [] Other    Edema:  Edema Location: Left upper extremity  [] 1+ Edema [x] 2+ Edema  [] 3+ Edema [] 4+ Edema  Edema Location: Left breast  [] 1+ Edema [] 2+ Edema  [x] 3+ Edema [] 4+ Edema  Edema Location: Left axilla  [] 1+ Edema [] 2+ Edema  [x] 3+ Edema [] 4+ Edema    Subjective: Patient arrived to clinic feeling stressed and a bit overwhelmed.      Objective:  [] Measurement [x]Manual Lymph Drainage  []Bandaging   []Kinesiotaping [x] Education (education provided) [x]Self Massage   []Self Bandaging [x] Exercise    [x] Elevation (Since December 2021)  []Wound Care [x]Hygiene  [x] Other: education on compression    Assessment:  Knowledge of home program:   [] Good [] Fair  [] Poor  Patient is programming at home: [] Yes  [] No  Family is assisting with programming: [] Yes  [] No  Home programming is consistent:             [] Yes  [] No  Other:   Response to treatment: Patient showing a good response to treatment however still feels overwhelmed with all the new information provided. Instructions for patient:   [x] Verbal [x] Written  Instructions addressed: Patient educated to work on sequence however patient left handouts that were provided in the bathroom this date.      Time In: 1200            Time Out: 1300                      Timed Code Treatment Minutes: 60 minutes      CODE  Minutes  Units   70054 OT Eval Low     78524 OT Eval Medium     21442 OT Eval High     50794 Fluidotherapy     13824 Manual 47 3   39630 Therapeutic Ex     17996 Therapeutic Activity 13 1   07184 ADL/COMP Tech Train     55038 Neuromuscular Re-Ed     78986 OrthoManagementTraining     19198 [facility]     42045 Electrical Stim - Attended     [id number] Iontophoresis     32148 Ultrasound      Other             Plan: Continue to address:  []Bandaging  [] Self Bandaging/Family Assist  [x]MLD  [x]Self Massage  [x]Exercise  [x]Education  [x]Obtain referral for garments  []Medical Hold  []Discharge to [address], [address] [place] [address] Rd

## 2023-01-26 ENCOUNTER — TELEPHONE (OUTPATIENT)
Dept: OCCUPATIONAL THERAPY | Age: 58
End: 2023-01-26

## 2023-01-26 NOTE — TELEPHONE ENCOUNTER
OCCUPATIONAL THERAPY PROGRESS NOTE  Phone: [phone]  Fax: [phone]     Date: 2023  Patient Name: Love Sharp        : 1965       Patient called to cancel appointment this date due to having a headache. Will see patient on Tuesday for next visit.        Ligia JONES, [facility]  Date: 2023

## 2023-01-31 ENCOUNTER — HOSPITAL ENCOUNTER (OUTPATIENT)
Dept: OCCUPATIONAL THERAPY | Age: 58
Setting detail: THERAPIES SERIES
Discharge: HOME | End: 2023-01-31
Payer: COMMERCIAL

## 2023-01-31 PROCEDURE — 97530 THERAPEUTIC ACTIVITIES: CPT

## 2023-01-31 PROCEDURE — 97140 MANUAL THERAPY 1/> REGIONS: CPT

## 2023-01-31 NOTE — PROGRESS NOTES
Occupational Therapy      OCCUPATIONAL THERAPY PROGRESS NOTE  Phone: [phone]  Fax: [phone]     Date:  2023  Initial Evaluation Date: 2023                  Evaluating Therapist: PETE Guardado/EMMY OMER     Patient Name:  Paulette Shields                :  1965     Restrictions/Precautions:  Left breast, fall risk, BMI 45.55  Diagnosis:  Ductal carcinoma in situ (DCIS) of left breast with microinvasive component ([facility] [address] [C50.912]                                                          Date of Surgery/Injury: CTV Left Breast +tb from 22-3/2/22 completing /TYT     Insurance/Certification information:  [place] blue access PPO  Plan of care signed (Y/N): N  Visit# / total visits: Evaluation + 2 treatment    Referring Practitioner/NPI#:  NA  Specific Practitioner Orders: Evaluation and treatment    Assessment of current deficits   [x]Pain  []Skin Integrity   [x]Lymphedema   []Functional transfers/mobility   []ADLs   []Strength    []Cognition  []IADLs   []Safety Awareness   []  Motor Endurance    []Fine Motor Coordination   []Balance   []Vision/perception  []Sensation []Gross Motor Coordination  [x]ROM     OT PLAN OF CARE   OT POC based on physician orders, patient diagnosis and results of clinical assessment    Frequency/Duration: 1-2x/week for 12 treatment sessions from 23 through 23   Projected units Requested:48  Approved days/units: 25 visits approved/ 2 used    Specific OT Treatment to include: 11628, 23597, 17502, 82552  Plan of Care:     [x]97140-Manual Lymph Drainage and Combined Decongestive Therapy  [x]52983- Skilled Multilayer Short Stretch Compression Bandaging/ Therapeutic Exercise  [x]Skin Care Education [x]HEP including Self MLD Education and/or Self Bandaging  [x]Education for Lymphedema Risks/Precautions     [x] Caregiver Education   []other:      Patient Specific Goal: Decreased pain and swelling of left breast    1-Patient will demonstrate good knowledge and understanding for lymphedema precautions, skin care and self-management to decrease progression of lymphedema and risk of infection. : Therapist continued to complete education to patient on improving skin care and attempting to reduce sodium throughout the day. Patient stated she is trying to reduce sodium within her diet. Will continue to work with patient to increase education on lymphedema and improve proper care. 2-Patient will present with decreased limb volume in the involved extremity from moderate to min for improved indep in client centered tasks. :  Therapist completed the manual lymphatic massage to left upper arm and breast with education provided on each step as the step was being performed. Patient showing more understanding of massage  with patient feeling much better after session. Will continue to work with patient to increase understanding and reduce swelling through areas on left side. 3-Patient will demonstrate compliance with compression therapy for independent management of lymphedema. : Information sent to DME to fit and order a compression bra for patient. Will continue to work with DME to obtain proper compression. 4-Pt will perform HEP with min assistance to reduce swelling and improve ROM in left upper extremity flex/abduction to 160° degrees for participation in ADLs and client centered tasks. : Educated and performed ROM exercises and manipulation to left axilla and flank to increase ROM and reduce feeling of cording. Will provide patient with exercises to complete at home to improve ROM and reduce swelling through left side. LT weeks  1-Patient will be independent with self-management of lymphedema including understanding garment wear schedule, self-compression bandaging, HEP and self-care. Working towards goal.   2-Patient will be fitted for appropriate compression garments for long term management of lymphedema.   Fax sent to DME for patient to become properly fitted for a compression bra and possible arm sleeve. Will wait for patient to receive a phone call from DME.   3- Patient will present with decreased limb volume in the involved extremity from min to trace  for improved indep in client centered tasks.    Will continue to work towards goal.        Restrictions/Precautions:  [] No blood pressure/blood draw in: [x] Left Upper Extremity     [] Right Upper Extremity        [] Fall Risk       Intervention:   []Other    Pain:  [] No    [x] Yes  Location: Left upper body  Pain Rating (0-10 pain scale):   Pain Description: moderate, aching, heaviness, throbbing, tight (pulling), and uncomfortable  Interruption of Treatment [] Yes  [x] No    Came to Clinic:  [] Bandaged    []Unbandaged    [] With Stocking     [] With Sleeve     [] Kinesiotaped    [] Unna Boot    [] With Alternative Compression:    Skin Integrity:  [] Normal [] Dry  []Rough []Moist []Rash  Location of problem area/s:  [] Wound: Location/Description   [x] Fibrosis: Location/Description: left lateral breast, inferior breast and flank  [] Keratosis: Location/Description  [] Papillomas: Location/Description  [x] Hyperplasia   []Hyperkeratosis  [x]Hyperpigmentation: Left breast and beneath the breast.   []Papillomas  []Lymphorrhea  [] Other    Edema:  Edema Location: Left upper extremity  [x] 1+ Edema [] 2+ Edema  [] 3+ Edema [] 4+ Edema  Edema Location: Left breast  [] 1+ Edema [] 2+ Edema  [x] 3+ Edema [] 4+ Edema  Edema Location: Left axilla  [] 1+ Edema [] 2+ Edema  [x] 3+ Edema [] 4+ Edema    Subjective: Patient arrived to clinic with stating she felt a reduction after treatment last visit.     Objective:  [] Measurement [x]Manual Lymph Drainage  []Bandaging   []Kinesiotaping [x] Education (education provided) [x]Self Massage   []Self Bandaging [x] Exercise    [x] Elevation (Since December 2021)  []Wound Care [x]Hygiene  [x] Other: education on  compression    Assessment:  Knowledge of home program:   [] Good [x] Fair  [] Poor  Patient is programming at home:             [x] Yes  [] No  Family is assisting with programming: [] Yes  [x] No  Home programming is consistent:             [] Yes  [] No  Other:   Response to treatment: Patient showing a good response to treatment and is eager to receive a call from DME to receive a compression bra.   Instructions for patient:   [x] Verbal [x] Written  Instructions addressed: Patient educated to work on sequence and patient folded handouts into pocket so she doesn't leave handouts in the bathroom.     Time In: 1200            Time Out: 1300                      Timed Code Treatment Minutes: 60 minutes      CODE  Minutes  Units   25054 OT Eval Low     76217 OT Eval Medium     99740 OT Eval High     56427 Fluidotherapy     13055 Manual 48 3   69524 Therapeutic Ex     25234 Therapeutic Activity 12 1   38714 ADL/COMP Tech Train     96969 Neuromuscular Re-Ed     01901 OrthoManagementTraining     60671 Paraffin     18556 Electrical Stim - Attended     32407 Iontophoresis     11670 Ultrasound      Other             Plan: Continue to address:  []Bandaging  [] Self Bandaging/Family Assist  [x]MLD  [x]Self Massage  [x]Exercise  [x]Education  [x]Obtain referral for garments  []Medical Hold  []Discharge to Home Program  Sarah Gonda COTA/NEGRA, CLT  [id number]

## 2023-02-02 ENCOUNTER — HOSPITAL ENCOUNTER (OUTPATIENT)
Dept: OCCUPATIONAL THERAPY | Age: 58
Setting detail: THERAPIES SERIES
Discharge: HOME | End: 2023-02-02
Payer: COMMERCIAL

## 2023-02-02 PROCEDURE — 97140 MANUAL THERAPY 1/> REGIONS: CPT

## 2023-02-02 NOTE — PROGRESS NOTES
Occupational Therapy      OCCUPATIONAL THERAPY PROGRESS NOTE  Phone: [phone]  Fax: [phone]     Date:  2023  Initial Evaluation Date: 2023                  Evaluating Therapist: PETE Rouse/EMMY OMER     Patient Name:  Fermin Rosenthal                :  1965     Restrictions/Precautions:  Left breast, fall risk, BMI 45.55  Diagnosis:  Ductal carcinoma in situ (DCIS) of left breast with microinvasive component ([facility] [address] [C50.912]                                                          Date of Surgery/Injury: CTV Left Breast +tb from 22-3/2/22 completing [id number]     Insurance/Certification information:  [place] blue access PPO  Plan of care signed (Y/N): N  Visit# / total visits: Evaluation + 3 treatment    Referring Practitioner/NPI#:  NA  Specific Practitioner Orders: Evaluation and treatment    Assessment of current deficits   [x]Pain  []Skin Integrity   [x]Lymphedema   []Functional transfers/mobility   []ADLs   []Strength    []Cognition  []IADLs   []Safety Awareness   []  Motor Endurance    []Fine Motor Coordination   []Balance   []Vision/perception  []Sensation []Gross Motor Coordination  [x]ROM     OT PLAN OF CARE   OT POC based on physician orders, patient diagnosis and results of clinical assessment    Frequency/Duration: 1-2x/week for 12 treatment sessions from 23 through 23   Projected units Requested:48  Approved days/units: 25 visits approved/ 3 used    Specific OT Treatment to include: 52665, 94363, 19947, 29942  Plan of Care:     [x]97140-Manual Lymph Drainage and Combined Decongestive Therapy  [x]43108- Skilled Multilayer Short Stretch Compression Bandaging/ Therapeutic Exercise  [x]Skin Care Education [x]HEP including Self MLD Education and/or Self Bandaging  [x]Education for Lymphedema Risks/Precautions     [x] Caregiver Education   []other:      Patient Specific Goal: Decreased pain and swelling of left breast    1-Patient will demonstrate good knowledge and understanding for lymphedema precautions, skin care and self-management to decrease progression of lymphedema and risk of infection. 2/2: Therapist continued to complete education to patient on improving skin care and attempting to reduce sodium throughout the day. Patient stated she is still trying to reduce sodium within her diet. Will continue to work with patient to increase education on lymphedema and improve proper care. 2-Patient will present with decreased limb volume in the involved extremity from moderate to min for improved indep in client centered tasks. 2/2:  Therapist completed the manual lymphatic massage to left upper arm and breast with education provided on each step as the step was being performed. Patient showing more understanding of massage with patient feeling much better after session. Patient stated she has not performed massage on herself yet but will try over the weekend. Will continue to work with patient to increase understanding and reduce swelling through areas on left side. 3-Patient will demonstrate compliance with compression therapy for independent management of lymphedema. 2/2: Information sent to DME to fit and order a compression bra for patient. Also sent information for a compression pump for patient. Will continue to work with DME to obtain proper compression. 4-Pt will perform HEP with min assistance to reduce swelling and improve ROM in left upper extremity flex/abduction to 160° degrees for participation in ADLs and client centered tasks. 2/2: Educated and performed ROM exercises and manipulation to left axilla and flank to increase ROM and reduce feeling of cording. Will provide patient with exercises to complete at home to improve ROM and reduce swelling through left side.      LT weeks  1-Patient will be independent with self-management of lymphedema including understanding garment wear schedule, self-compression bandaging, HEP and self-care. Working towards goal.   2-Patient will be fitted for appropriate compression garments for long term management of lymphedema. Fax sent to DME for patient to become properly fitted for a compression bra and possible arm sleeve. Therapist also sent information to [employer] to see if patient is eligible for a compression pump. Will wait for patient to receive a phone call from DME. 3- Patient will present with decreased limb volume in the involved extremity from min to trace  for improved indep in client centered tasks. Will continue to work towards goal.        Restrictions/Precautions:  [] No blood pressure/blood draw in: [x] Left Upper Extremity     [] Right Upper Extremity        [] Fall Risk       Intervention:   []Other    Pain:  [] No    [x] Yes  Location: Left upper body  Pain Rating (0-10 pain scale):   Pain Description: moderate, aching, heaviness, throbbing, tight (pulling), and uncomfortable  Interruption of Treatment [] Yes  [x] No    Came to Clinic:  [] Bandaged    []Unbandaged    [] With Stocking     [] With Sleeve     [] Kinesiotaped    [] [employer]    [x] With Alternative Compression: Compression bra (patient stating too tight)    Skin Integrity:  [] Normal [] Dry  []Rough []Moist []Rash  Location of problem area/s:  [] Wound: Location/Description   [x] Fibrosis: Location/Description: left lateral breast, inferior breast and flank  [] Keratosis: Location/Description  [] Papillomas: Location/Description  [x] Hyperplasia   []Hyperkeratosis  [x]Hyperpigmentation: Left breast and beneath the breast.   []Papillomas  []Lymphorrhea  [] Other    Edema:  Edema Location: Left upper extremity  [x] 1+ Edema [] 2+ Edema  [] 3+ Edema [] 4+ Edema  Edema Location: Left breast  [] 1+ Edema [] 2+ Edema  [x] 3+ Edema [] 4+ Edema  Edema Location: Left axilla  [] 1+ Edema [] 2+ Edema  [x] 3+ Edema [] 4+ Edema    Subjective: Patient arrived to clinic alone eager to reduce fluid through left side. Objective:  [] Measurement [x]Manual Lymph Drainage   []Bandaging   []Kinesiotaping [x] Education (education provided)  [x]Self Massage   []Self Bandaging [x] Exercise     [x] Elevation (Since December 2021)  []Wound Care [x]Hygiene     [x] Other: education on compression    Assessment:  Knowledge of home program:   [] Good [x] Fair  [] Poor  Patient is programming at home:             [x] Yes  [] No  Family is assisting with programming: [] Yes  [x] No  Home programming is consistent:             [] Yes  [] No  Other:   Response to treatment: Patient showing a good response to treatment and is eager to receive a call from DME to receive a compression bra. Instructions for patient:   [x] Verbal [x] Written  Instructions addressed: Patient educated to work on sequence throughout the weekend and wear compression. Also asked patient bring in compression bra that prior therapy had her buy.      Time In: 1200            Time Out: 1300                      Timed Code Treatment Minutes: 60 minutes      CODE  Minutes  Units   55977 OT Eval Low     64954 OT Eval Medium     29850 OT Eval High     75633 Fluidotherapy     73593 Manual 49 3   41033 Therapeutic Ex     41179 Therapeutic Activity 11 1   41525 ADL/COMP Tech Train     68364 Neuromuscular Re-Ed     95203 OrthoManagementTraining     04788 Paraffin     45478 Electrical Stim - Attended     [id number] Iontophoresis     90634 Ultrasound      Other             Plan: Continue to address:  []Bandaging  [] Self Bandaging/Family Assist  [x]MLD  [x]Self Massage  [x]Exercise  [x]Education  [x]Obtain referral for garments  []Medical Hold  []Discharge to [address] [place] [address], [address] [place] [address] Rd

## 2023-02-07 ENCOUNTER — HOSPITAL ENCOUNTER (OUTPATIENT)
Dept: OCCUPATIONAL THERAPY | Age: 58
Setting detail: THERAPIES SERIES
Discharge: HOME | End: 2023-02-07
Payer: COMMERCIAL

## 2023-02-07 PROCEDURE — 97530 THERAPEUTIC ACTIVITIES: CPT

## 2023-02-07 NOTE — PROGRESS NOTES
Occupational Therapy      OCCUPATIONAL THERAPY PROGRESS NOTE  Phone: [phone]  Fax: [phone]     Date:  2023  Initial Evaluation Date: 2023                  Evaluating Therapist: AGUILAR Veliz CLT     Patient Name:  Sandra Flowers                :  1965     Restrictions/Precautions:  Left breast, fall risk, BMI 45.55  Diagnosis:  Ductal carcinoma in situ (DCIS) of left breast with microinvasive component ([facility] [address] [C50.912]                                                          Date of Surgery/Injury: CTV Left Breast +tb from 22-3/2/22 completing [id number]     Insurance/Certification information:  [place] blue access PPO  Plan of care signed (Y/N): N  Visit# / total visits: Evaluation + 4 treatment    Referring Practitioner/NPI#:  NA  Specific Practitioner Orders: Evaluation and treatment    Assessment of current deficits   [x]Pain  []Skin Integrity   [x]Lymphedema   []Functional transfers/mobility   []ADLs   []Strength    []Cognition  []IADLs   []Safety Awareness   []  Motor Endurance    []Fine Motor Coordination   []Balance   []Vision/perception  []Sensation []Gross Motor Coordination  [x]ROM     OT PLAN OF CARE   OT POC based on physician orders, patient diagnosis and results of clinical assessment    Frequency/Duration: 1-2x/week for 12 treatment sessions from 23 through 23   Projected units Requested:48  Approved days/units: 25 visits approved/ 4 used    Specific OT Treatment to include: 88515, 85470, 38343, 81542  Plan of Care:     [x]97140-Manual Lymph Drainage and Combined Decongestive Therapy  [x]73573- Skilled Multilayer Short Stretch Compression Bandaging/ Therapeutic Exercise  [x]Skin Care Education [x]HEP including Self MLD Education and/or Self Bandaging  [x]Education for Lymphedema Risks/Precautions     [x] Caregiver Education   []other:      Patient Specific Goal: Decreased pain and swelling of left breast    1-Patient will demonstrate good knowledge and understanding for lymphedema precautions, skin care and self-management to decrease progression of lymphedema and risk of infection. 2: Therapist continued to educate to patient on improving skin care and attempting to reduce sodium throughout the day. Patient stated she is still trying to reduce sodium within her diet, however difficult due to work schedule. Will continue to work with patient to increase education on lymphedema and improve proper care. Patient progressing towards goal.   2-Patient will present with decreased limb volume in the involved extremity from moderate to min for improved indep in client centered tasks. 2: Upon arrival patient still showing swelling through axilla, flank, breast, and bank. Patient still attempting to complete manual lymphatic massage on herself. [employer] representative came to session this date to trial flexitouch compression pump to improve home program and improve ability to manage lymphatic symptoms. Will continue to work with patient to increase understanding and reduce swelling through areas on left side. 3-Patient will demonstrate compliance with compression therapy for independent management of lymphedema. 2: Information sent to DME to fit and order a compression bra for patient, still waiting for DME to contact patient for fitting. Will wait on approval from insurance for flexitouch compression pump. Will continue to work with DME to obtain proper compression. 4-Pt will perform HEP with min assistance to reduce swelling and improve ROM in left upper extremity flex/abduction to 160° degrees for participation in ADLs and client centered tasks. 2: Continued to educate patient on performing ROM exercises throughout the week. LT weeks  1-Patient will be independent with self-management of lymphedema including understanding garment wear schedule, self-compression bandaging, HEP and self-care.   Working towards goal.   2-Patient will be fitted for appropriate compression garments for long term management of lymphedema. Fax sent to DME for patient to become properly fitted for a compression bra and possible arm sleeve. Will wait for patient to receive a phone call from DME. 3- Patient will present with decreased limb volume in the involved extremity from min to trace  for improved indep in client centered tasks. Will continue to work towards goal.        Restrictions/Precautions:  [] No blood pressure/blood draw in: [x] Left Upper Extremity     [] Right Upper Extremity        [] Fall Risk       Intervention:   []Other    Pain:  [] No    [x] Yes  Location: Left upper body  Pain Rating (0-10 pain scale):   Pain Description: moderate, aching, heaviness, throbbing, tight (pulling), and uncomfortable  Interruption of Treatment [] Yes  [x] No    Came to Clinic:  [] Bandaged    []Unbandaged    [] With Stocking     [] With Sleeve     [] Kinesiotaped    [] [employer]    [x] With Alternative Compression: Compression bra (patient stating too tight)    Skin Integrity:  [] Normal [] Dry  []Rough []Moist []Rash  Location of problem area/s:  [] Wound: Location/Description   [x] Fibrosis: Location/Description: left lateral breast, inferior breast and flank  [] Keratosis: Location/Description  [] Papillomas: Location/Description  [x] Hyperplasia   []Hyperkeratosis  [x]Hyperpigmentation: Left breast and beneath the breast.   []Papillomas  []Lymphorrhea  [] Other    Edema:  Edema Location: Left upper extremity  [x] 1+ Edema [] 2+ Edema  [] 3+ Edema [] 4+ Edema  Edema Location: Left breast  [] 1+ Edema [] 2+ Edema  [x] 3+ Edema [] 4+ Edema  Edema Location: Left axilla  [] 1+ Edema [] 2+ Edema  [x] 3+ Edema [] 4+ Edema    Subjective: Patient arrived to clinic alone eager to reduce fluid through left side.      Objective:  [] Measurement [x]Manual Lymph Drainage   []Bandaging   []Kinesiotaping [x] Education (1 month)       [x]Self Massage   []Self Bandaging [x] Exercise (3 weeks)   [x] Elevation (Since December 2021)  []Wound Care [x]Hygiene     [x] Other: education on compression pump     Assessment:  Knowledge of home program:   [] Good [x] Fair  [] Poor  Patient is programming at home:             [x] Yes  [] No  Family is assisting with programming: [] Yes  [x] No  Home programming is consistent:             [] Yes  [] No  Other:   Response to treatment: Patient showing a good response to treatment and is eager to receive a call from DME to receive a compression bra. Instructions for patient:   [x] Verbal [] Written  Instructions addressed: Patient educated to work on sequence throughout the weekend and wear compression.      Time In: 1200            Time Out: 1300                      Timed Code Treatment Minutes: 60 minutes      CODE  Minutes  Units   37406 OT Eval Low     81910 OT Eval Medium     64080 OT Eval High     86774 Fluidotherapy     65088 Manual     36359 Therapeutic Ex     53145 Therapeutic Activity 60 4   53238 ADL/COMP Tech Train     72762 Neuromuscular Re-Ed     20593 OrthoManagementTraining     18145 Paraffin     69431 Electrical Stim - Attended     33843 Iontophoresis     33569 Ultrasound      Other             Plan: Continue to address:  []Bandaging  [] Self Bandaging/Family Assist  [x]MLD  [x]Self Massage  [x]Exercise  [x]Education  [x]Obtain referral for garments  []Medical Hold  []Discharge to [address] [place] [address], [address] [place] [address] Rd

## 2023-02-10 ENCOUNTER — TELEPHONE (OUTPATIENT)
Dept: OCCUPATIONAL THERAPY | Age: 58
End: 2023-02-10

## 2023-02-10 ENCOUNTER — HOSPITAL ENCOUNTER (OUTPATIENT)
Dept: OCCUPATIONAL THERAPY | Age: 58
Setting detail: THERAPIES SERIES
End: 2023-02-10
Payer: COMMERCIAL

## 2023-02-10 NOTE — TELEPHONE ENCOUNTER
OCCUPATIONAL THERAPY PROGRESS NOTE  Phone: [phone]  Fax: [phone]     Date: 2/10/2023  Patient Name: Avtar Rabago        : 1965       Patient had a procedure yesterday and is still having some effects from the procedure. Does not feel comfortable coming in today. Explained the attendance policy with patient, however this patient shows up for her appointments when normally scheduled. Will continue treatment on next visit.      Jacquelyn JONES, [facility]     Date: 2/10/2023

## 2023-02-14 ENCOUNTER — TELEPHONE (OUTPATIENT)
Dept: OCCUPATIONAL THERAPY | Age: 58
End: 2023-02-14

## 2023-02-14 NOTE — TELEPHONE ENCOUNTER
OCCUPATIONAL THERAPY PROGRESS NOTE  Phone: [phone]  Fax: [phone]     Date: 2023  Patient Name: Theo Pierce        : 1965         Patient did not call or show appointment this date. Called patient and left message for next appointment scheduled.          Marcos JONES, 50 [facility]  Date: 2023

## 2023-02-21 ENCOUNTER — HOSPITAL ENCOUNTER (OUTPATIENT)
Dept: OCCUPATIONAL THERAPY | Age: 58
Setting detail: THERAPIES SERIES
Discharge: HOME | End: 2023-02-21
Payer: COMMERCIAL

## 2023-02-21 NOTE — PROGRESS NOTES
Occupational Therapy          [facility]  [address] [place] [address]  Dept: [phone]  Loc: [address] [facility] OT fax [phone]    Cancellation/No Show Note      Date:  2023    Patient Name:  Seth Hardy     :  1965         PT ID: [id number]    Total missed visits including today: 0       Total number of no shows: 0    For today's appointment patient:   [x]  Cancelled   [x]  Rescheduled appointment   []  No-show     Reason given by patient:   []  Patient ill   []  Conflicting appointment   []  No transportation   []  Conflict with work   []  No reason given   [x]  Other:    Comments:                    Comments:  therapist emergency. Next appointment scheduled. Electronically signed by:   PETE Chawla/L, [employer]

## 2023-02-23 ENCOUNTER — HOSPITAL ENCOUNTER (OUTPATIENT)
Dept: OCCUPATIONAL THERAPY | Age: 58
Setting detail: THERAPIES SERIES
Discharge: HOME | End: 2023-02-23
Payer: COMMERCIAL

## 2023-02-23 PROCEDURE — 97530 THERAPEUTIC ACTIVITIES: CPT | Performed by: OCCUPATIONAL THERAPIST

## 2023-02-23 PROCEDURE — 97140 MANUAL THERAPY 1/> REGIONS: CPT | Performed by: OCCUPATIONAL THERAPIST

## 2023-02-23 NOTE — PROGRESS NOTES
Occupational Therapy      OCCUPATIONAL THERAPY PROGRESS NOTE  Phone: [phone]  Fax: [phone]     Date:  2023  Initial Evaluation Date: 2023                  Evaluating Therapist: AGUILAR Hogue CLT     Patient Name:  Mary Cid                :  1965     Restrictions/Precautions:  Left breast, fall risk, BMI 45.55  Diagnosis:  Ductal carcinoma in situ (DCIS) of left breast with microinvasive component ([facility] [address] [C50.912]                                                          Date of Surgery/Injury: CTV Left Breast +tb from 22-3/2/22 completing [id number]     Insurance/Certification information:  [employer] blue access PPO  Plan of care signed (Y/N): No-sent 23 and 23  Visit# / total visits: Evaluation + 5 treatment    Referring Practitioner/NPI#:  Elvis Brady MD; NA  Specific Practitioner Orders: Evaluation and treatment    Assessment of current deficits   [x]Pain  []Skin Integrity   [x]Lymphedema   []Functional transfers/mobility   []ADLs   []Strength    []Cognition  []IADLs   []Safety Awareness   []  Motor Endurance    []Fine Motor Coordination   []Balance   []Vision/perception  []Sensation []Gross Motor Coordination  [x]ROM     OT PLAN OF CARE   OT POC based on physician orders, patient diagnosis and results of clinical assessment    Frequency/Duration: 1-2x/week for 12 treatment sessions from 23 through 23   Projected units Requested:48  Approved days/units: 25 visits approved    Specific OT Treatment to include: 18459, 26604, 68259, 68069  Plan of Care:     [x]97140-Manual Lymph Drainage and Combined Decongestive Therapy  [x]85629- Skilled Multilayer Short Stretch Compression Bandaging/ Therapeutic Exercise  [x]Skin Care Education [x]HEP including Self MLD Education and/or Self Bandaging  [x]Education for Lymphedema Risks/Precautions     [x] Caregiver Education   []other:      Patient Specific Goal: Decreased pain and swelling of left breast    1-Patient will demonstrate good knowledge and understanding for lymphedema precautions, skin care and self-management to decrease progression of lymphedema and risk of infection. 2/23: Therapist continued to educate to patient on improving skin care and attempting to reduce sodium throughout the day. Patient stated she is still trying to reduce sodium within her diet, however difficult due to work schedule. Pt presents with fair understanding of lymphedema treatment and/or precautions, skin care or infection prevention. Provided and reviewed handouts on risk reduction, travel, therapist training, exercise, breast cancer related lymphedema screening and treatments. Therapist continued patient education regarding the above for safety and for self management. Patient able to verbalize understanding. Patient progressing toward goal.  2-Patient will present with decreased limb volume in the involved extremity from moderate to min for improved indep in client centered tasks. 2/23: Upon arrival patient still showing swelling through axilla, flank, breast, and bank. Patient still attempting to complete manual lymphatic massage on herself, but reports min results overall. Completed MLD sequence including short neck sequence, L axilla, abdominal sequence, axillary-inguinal and left breast to improve tissue extensibility; increase range of motion; induce relaxation; mobilize or manipulate soft tissue and joints; modulate pain; and reduce edema for carry over into self care and client centered tasks. Pt with fair understanding of sequence. Noted edema at scar area of inferior, center breast.   Will continue to work with patient to increase understanding and reduce swelling through areas on left side. 3-Patient will demonstrate compliance with compression therapy for independent management of lymphedema.   2/23: Information sent previously to DME to fit and order a compression bra for patient, still waiting for DME to contact patient for fitting. Will wait on approval from insurance for flexitouch compression pump. Faxed PAC form during today's session for financial assistance. Will continue to work with DME to obtain proper compression. Pt to call bra fitter this week and update therapist next session. To continue addressing. 4-Pt will perform HEP with min assistance to reduce swelling and improve ROM in left upper extremity flex/abduction to 160° degrees for participation in ADLs and client centered tasks. : Continued to educate patient on performing ROM exercises throughout the week. To address more thoroughly next session. LT weeks  1-Patient will be independent with self-management of lymphedema including understanding garment wear schedule, self-compression bandaging, HEP and self-care. Working towards goal.   2-Patient will be fitted for appropriate compression garments for long term management of lymphedema. Fax sent to DME for patient to become properly fitted for a compression bra and possible arm sleeve. Will wait for patient to receive a phone call from DME. 3- Patient will present with decreased limb volume in the involved extremity from min to trace  for improved indep in client centered tasks.     Will continue to work towards goal.     Restrictions/Precautions:  [] No blood pressure/blood draw in: [x] Left Upper Extremity     [] Right Upper Extremity        [] Fall Risk       Intervention:   []Other    Pain:  [] No    [x] Yes  Location: Left upper body  Pain Rating (0-10 pain scale): 3/10  Pain Description: moderate, aching, heaviness, throbbing, tight (pulling), and uncomfortable  Interruption of Treatment [] Yes  [x] No    Came to Clinic:  [] Bandaged    []Unbandaged    [] With Stocking     [] With Sleeve     [] Kinesiotaped    [] Kathreen Karlee    [x] With Alternative Compression: Compression bra (patient stating too tight)    Skin Integrity:  [] Normal [] Dry  []Rough []Moist []Rash  Location of problem area/s:  [] Wound: Location/Description   [x] Fibrosis: Location/Description: left lateral breast, inferior breast and flank  [] Keratosis: Location/Description  [] Papillomas: Location/Description  [x] Hyperplasia   []Hyperkeratosis  [x]Hyperpigmentation: Left breast, scar areas and beneath the breast.   []Papillomas  []Lymphorrhea  [] Other    Edema:  Edema Location: Left upper extremity  [x] 1+ Edema [] 2+ Edema  [] 3+ Edema [] 4+ Edema  Edema Location: Left inferior breast  [] 1+ Edema [] 2+ Edema  [x] 3+ Edema [] 4+ Edema  Edema Location: Left axilla  [] 1+ Edema [] 2+ Edema  [x] 3+ Edema [] 4+ Edema    Subjective: Patient arrived to clinic with confusion due to scheduled appointment cancelled. This therapist was able to see her for session. Objective:  [] Measurement [x]Manual Lymph Drainage   []Bandaging   []Kinesiotaping [x] Education (1 month)       [x]Self Massage   []Self Bandaging [x] Exercise (3 weeks)   [x] Elevation (Since December 2021)  []Wound Care [x]Hygiene     [x] Other: education on compression pump     Assessment:  Knowledge of home program:   [] Good [x] Fair  [] Poor  Patient is programming at home:             [x] Yes  [] No  Family is assisting with programming: [] Yes  [x] No  Home programming is consistent:             [] Yes  [x] No  Other:   Response to treatment: Patient showing a good response to treatment and is eager to receive a call from DME to receive a compression bra. Instructions for patient:   [x] Verbal [] Written  Instructions addressed: Patient educated to work on sequence throughout the weekend and to call DME regarding compression.      Time In: 1215            Time Out: 1300                      Timed Code Treatment Minutes: 45 minutes    CODE  Minutes  Units   20676 OT Eval Low     69373 OT Eval Medium     75261 OT Eval High     95502 Fluidotherapy     88501 Manual 30 2   54308 Therapeutic Ex     50347 Therapeutic Activity 15 1   96548 ADL/COMP Tech Train     [id number] Neuromuscular Re-Ed     [id number] OrthoManagementTraining     [id number] Paraffin     [id number] Electrical Stim - Attended     [id number] Iontophoresis     26576 Ultrasound      Other     Total  45 3     Plan: Continue to address:  []Bandaging  [] Self Bandaging/Family Assist  [x]MLD  [x]Self Massage  [x]Exercise  [x]Education  [x]Obtain referral for garments  []Medical Hold  []Discharge to [address] [place] [address], [address] [place] [address] Rd

## 2023-02-28 ENCOUNTER — HOSPITAL ENCOUNTER (OUTPATIENT)
Dept: OCCUPATIONAL THERAPY | Age: 58
Setting detail: THERAPIES SERIES
Discharge: HOME | End: 2023-02-28
Payer: COMMERCIAL

## 2023-02-28 PROCEDURE — 97110 THERAPEUTIC EXERCISES: CPT | Performed by: OCCUPATIONAL THERAPIST

## 2023-02-28 PROCEDURE — 97530 THERAPEUTIC ACTIVITIES: CPT | Performed by: OCCUPATIONAL THERAPIST

## 2023-02-28 PROCEDURE — 97140 MANUAL THERAPY 1/> REGIONS: CPT | Performed by: OCCUPATIONAL THERAPIST

## 2023-02-28 NOTE — PROGRESS NOTES
Occupational Therapy      OCCUPATIONAL THERAPY PROGRESS NOTE  Phone: [phone]  Fax: [phone]     Date:  2023  Initial Evaluation Date: 2023                  Evaluating Therapist: PETE Enriquez/L, CLT     Patient Name:  Oliver Jolly                :  1965     Restrictions/Precautions:  Left breast, fall risk, BMI 45.55  Diagnosis:  Ductal carcinoma in situ (DCIS) of left breast with microinvasive component ([facility] [address] [C50.912]                                                          Date of Surgery/Injury: CTV Left Breast +tb from 22-3/2/22 completing [id number]     Insurance/Certification information:  [employer] access PPO  Plan of care signed (Y/N): Yes-23  Visit# / total visits: Evaluation + 5 treatment    Referring Practitioner/NPI#:  Tere Jarquin MD; [id number]  Specific Practitioner Orders: Evaluation and treatment    Assessment of current deficits   [x]Pain  []Skin Integrity   [x]Lymphedema   []Functional transfers/mobility   []ADLs   []Strength    []Cognition  []IADLs   []Safety Awareness   []  Motor Endurance    []Fine Motor Coordination   []Balance   []Vision/perception  []Sensation []Gross Motor Coordination  [x]ROM     OT PLAN OF CARE   OT POC based on physician orders, patient diagnosis and results of clinical assessment    Frequency/Duration: 1-2x/week for 12 treatment sessions from 23 through 23   Projected units Requested:48  Approved days/units: 25 visits approved    Specific OT Treatment to include: 13483, 13393, 32265, 37835  Plan of Care:     [x]97140-Manual Lymph Drainage and Combined Decongestive Therapy  [x]64922- Skilled Multilayer Short Stretch Compression Bandaging/ Therapeutic Exercise  [x]Skin Care Education [x]HEP including Self MLD Education and/or Self Bandaging  [x]Education for Lymphedema Risks/Precautions     [x] Caregiver Education   []other:      Patient Specific Goal: Decreased pain and swelling of left breast    1-Patient will demonstrate good knowledge and understanding for lymphedema precautions, skin care and self-management to decrease progression of lymphedema and risk of infection. 2/28: Therapist continued to educate to patient on improving skin care and attempting to reduce sodium throughout the day. Pt presents with fair+ understanding of lymphedema treatment and/or precautions, skin care or infection prevention. Previously provided and reviewed handouts on risk reduction, travel, therapist training, exercise, breast cancer related lymphedema screening and treatments. Therapist continued patient education regarding the above for safety and for self management. Patient able to verbalize understanding. Patient progressing toward goal.  2-Patient will present with decreased limb volume in the involved extremity from moderate to min for improved indep in client centered tasks. 2/28: Upon arrival patient still showing swelling through axilla, flank, and breast. Patient still attempting to complete manual lymphatic massage on herself, but reports min results overall. Both therapist and patient do notice a significant improvement this session compared to last week as far as fluid reduction. Completed MLD sequence including short neck sequence, L axilla, abdominal sequence, axillary-inguinal and left breast to improve tissue extensibility; increase range of motion; induce relaxation; mobilize or manipulate soft tissue and joints; modulate pain; and reduce edema for carry over into self care and client centered tasks. Pt with fair understanding of sequence. Noted edema at scar area of inferior breast and medical breast.   Will continue to work with patient to increase understanding and reduce swelling through areas on left side. 3-Patient will demonstrate compliance with compression therapy for independent management of lymphedema. 2/28: Information sent previously to DME to fit and order a compression bra for patient. Scheduled fitting for this Friday. Will wait on approval from insurance for flexitouch compression pump. Faxed PAC form during today's session for financial assistance. Will continue to work with DME to obtain proper compression. To continue addressing. 4-Pt will perform HEP with min assistance to reduce swelling and improve ROM in left upper extremity flex/abduction to 160° degrees for participation in ADLs and client centered tasks. : Initiated pt education on HEP for promotion of lymphatic drainage, decrease pain and to increase AROM. Pt completed cervical and scapular exercises 10xs each with good tolerance. Provided with hand out and demonstrated each exercise. Educated pt to complete exercises only after SMLD has been performed for energy conservation and highest impact of program. Good understanding. Completed only supine exercises this session 1-6 5xs each with 5 second hold each stretch with fair tolerance overall. Educated pt to slowly built tolerance. Limited with pain and ROM in abduction. Will continue to work with patient to improve ROM and incorporate exercises. LT weeks  1-Patient will be independent with self-management of lymphedema including understanding garment wear schedule, self-compression bandaging, HEP and self-care. Working towards goal.   2-Patient will be fitted for appropriate compression garments for long term management of lymphedema. Fax sent to DME for patient to become properly fitted for a compression bra and possible arm sleeve. Will wait for patient to receive a phone call from DME. 3- Patient will present with decreased limb volume in the involved extremity from min to trace  for improved indep in client centered tasks.     Will continue to work towards goal.     Restrictions/Precautions:  [] No blood pressure/blood draw in: [x] Left Upper Extremity     [] Right Upper Extremity        [] Fall Risk       Intervention:   []Other    Pain:  [] No    [x] Yes  Location: Left upper body  Pain Rating (0-10 pain scale): 3/10  Pain Description: moderate, aching, heaviness, throbbing, tight (pulling), and uncomfortable  Interruption of Treatment [] Yes  [x] No    Came to Clinic:  [] Bandaged    []Unbandaged    [] With Stocking     [] With Sleeve     [] Kinesiotaped    [] [employer]    [x] With Alternative Compression: Compression bra (patient stating too tight)    Skin Integrity:  [] Normal [] Dry  []Rough []Moist []Rash  Location of problem area/s:  [] Wound: Location/Description   [x] Fibrosis: Location/Description: left lateral breast, inferior breast and flank  [] Keratosis: Location/Description  [] Papillomas: Location/Description  [x] Hyperplasia   []Hyperkeratosis  [x]Hyperpigmentation: Left breast, scar areas and beneath the breast.   []Papillomas  []Lymphorrhea  [] Other    Edema:  Edema Location: Left upper extremity  [x] 1+ Edema [] 2+ Edema  [] 3+ Edema [] 4+ Edema  Edema Location: Left inferior breast  [] 1+ Edema [] 2+ Edema  [x] 3+ Edema [] 4+ Edema  Edema Location: Left axilla  [] 1+ Edema [x] 2+ Edema  [] 3+ Edema [] 4+ Edema    Subjective: Patient arrived to clinic with noted improvements to last session and reported better understanding and carry over at home with program.     Objective:  [] Measurement [x]Manual Lymph Drainage   []Bandaging   []Kinesiotaping [x] Education (1 month)       [x]Self Massage   []Self Bandaging [x] Exercise (3 weeks)   [x] Elevation (Since December 2021)  []Wound Care [x]Hygiene     [x] Other: education on compression pump     Assessment:  Knowledge of home program:   [] Good [x] Fair  [] Poor  Patient is programming at home:             [x] Yes  [] No  Family is assisting with programming: [] Yes  [x] No  Home programming is consistent:             [x] Yes -but limited with reach [] No  Other:   Response to treatment: Patient showing a good response to treatment and is eager to get fitted and receive pump.    Instructions for patient:   [x] Verbal [] Written  Instructions addressed: Patient educated to work on sequence throughout the weekend.     Time In: 1205            Time Out: 1300                      Timed Code Treatment Minutes: 55 minutes    CODE  Minutes  Units   27669 OT Eval Low     10113 OT Eval Medium     19387 OT Eval High     64958 Fluidotherapy     66356 Manual 25 2   42558 Therapeutic Ex 15 1   46444 Therapeutic Activity 15 1   45784 ADL/COMP Tech Train     40488 Neuromuscular Re-Ed     10438 OrthoManagementTraining     57569 Paraffin     93237 Electrical Stim - Attended     53068 Iontophoresis     07474 Ultrasound      Other     Total  55 4     Plan: Continue to address:  []Bandaging  [] Self Bandaging/Family Assist  [x]MLD  [x]Self Massage  [x]Exercise  [x]Education  [x]Obtain referral for garments  []Medical Hold  []Discharge to [address] [place] [address] Rd

## 2023-03-02 ENCOUNTER — HOSPITAL ENCOUNTER (OUTPATIENT)
Dept: OCCUPATIONAL THERAPY | Age: 58
Setting detail: THERAPIES SERIES
Discharge: HOME | End: 2023-03-02
Payer: COMMERCIAL

## 2023-03-02 PROCEDURE — 97140 MANUAL THERAPY 1/> REGIONS: CPT | Performed by: OCCUPATIONAL THERAPIST

## 2023-03-02 PROCEDURE — 97530 THERAPEUTIC ACTIVITIES: CPT | Performed by: OCCUPATIONAL THERAPIST

## 2023-03-02 NOTE — PROGRESS NOTES
Occupational Therapy      OCCUPATIONAL THERAPY LYMPHEDEMA UPDATE NOTE  Phone: [phone]  Fax: [phone]     Date:  3/2/2023  Initial Evaluation Date: 2023                  Evaluating Therapist: PETE Grewal/EMMY OMER     Patient Name:  Catherine Gunderson                :  1965     Restrictions/Precautions:  Left breast, fall risk, BMI 45.55  Diagnosis:  Ductal carcinoma in situ (DCIS) of left breast with microinvasive component ([facility] [address] [C50.912]                                                          Date of Surgery/Injury: CTV Left Breast +tb from 22-3/2/22 completing [id number]     Insurance/Certification information:  [place] blue access PPO  Plan of care signed (Y/N): Yes-23  Visit# / total visits: Evaluation + 7 treatment    Referring Practitioner/NPI#:  Juliet Domingo MD; [id number]  Specific Practitioner Orders: Evaluation and treatment    Assessment of current deficits   [x]Pain  []Skin Integrity   [x]Lymphedema   []Functional transfers/mobility   []ADLs   []Strength    []Cognition  []IADLs   []Safety Awareness   []  Motor Endurance    []Fine Motor Coordination   []Balance   []Vision/perception  []Sensation []Gross Motor Coordination  [x]ROM     OT PLAN OF CARE   OT POC based on physician orders, patient diagnosis and results of clinical assessment    Frequency/Duration: 1-2x/week for 12 treatment sessions from 23 through 23   Projected units Requested:48  Approved days/units: 25 visits approved    Specific OT Treatment to include: 76765, 54401, 43906, 18891  Plan of Care:     [x]97140-Manual Lymph Drainage and Combined Decongestive Therapy  [x]52297- Skilled Multilayer Short Stretch Compression Bandaging/ Therapeutic Exercise  [x]Skin Care Education [x]HEP including Self MLD Education and/or Self Bandaging  [x]Education for Lymphedema Risks/Precautions     [x] Caregiver Education   []other:      Patient Specific Goal: Decreased pain and swelling of left breast    1-Patient will demonstrate good knowledge and understanding for lymphedema precautions, skin care and self-management to decrease progression of lymphedema and risk of infection. 3/2: Pt presents with fair+ understanding of lymphedema treatment and/or precautions, skin care or infection prevention. Previously provided and reviewed handouts on risk reduction, travel, therapist training, exercise, breast cancer related lymphedema screening and treatments. Therapist continued patient education regarding the above for safety and for self management. Patient able to verbalize understanding. Patient progressing toward goal.  2-Patient will present with decreased limb volume in the involved extremity from moderate to min for improved indep in client centered tasks. 3/2: Upon arrival patient still showing swelling through axilla, flank, and breast. Patient still attempting to complete manual lymphatic massage on herself, but reports min results overall. Both therapist and patient do notice a significant improvement this session compared to last week as far as fluid reduction. Completed MLD sequence including short neck sequence, L axilla, abdominal sequence, axillary-inguinal and left breast to improve tissue extensibility; increase range of motion; induce relaxation; mobilize or manipulate soft tissue and joints; modulate pain; and reduce edema for carry over into self care and client centered tasks. Pt with fair understanding of sequence. Noted edema at scar area of inferior breast and medical breast. Measurements taken and listed below. Will continue to work with patient to increase understanding and reduce swelling through areas on left side. 3-Patient will demonstrate compliance with compression therapy for independent management of lymphedema. 3/2: Information sent previously to DME to fit and order a compression bra for patient. Scheduled fitting for this Friday.   Will wait on approval from insurance for flexitouch compression pump. Faxed PAC form for financial assistance. Will continue to work with DME to obtain proper compression. Provided with size E tubi  to L UE to provide overall compression due to reduction noted with measurements. Pt with fair tolerance and reporting tightness to the upper arm. Possible to recommend sleeve prior to DC. To continue addressing. 4-Pt will perform HEP with min assistance to reduce swelling and improve ROM in left upper extremity flex/abduction to 160° degrees for participation in ADLs and client centered tasks. : Contimued pt education on HEP for promotion of lymphatic drainage, decrease pain and to increase AROM. Pt completed cervical and scapular exercises 10xs each with good tolerance. Provided with hand out and demonstrated each exercise. Educated pt to complete exercises only after SMLD has been performed for energy conservation and highest impact of program. Good understanding. Completed only supine exercises this session 1-6 5xs each with 5 second hold each stretch with fair tolerance overall. Educated pt to slowly built tolerance. Limited with pain and ROM in abduction. Will continue to work with patient to improve ROM and incorporate exercises. LT weeks  1-Patient will be independent with self-management of lymphedema including understanding garment wear schedule, self-compression bandaging, HEP and self-care. Working towards goal. Denied from insurance for [employer], but currently in appeal process. 2-Patient will be fitted for appropriate compression garments for long term management of lymphedema. Fax sent to DME for patient to become properly fitted for a compression bra and possible arm sleeve. Will wait for patient to receive a phone call from DME. 3- Patient will present with decreased limb volume in the involved extremity from min to trace  for improved indep in client centered tasks.     Will continue to work towards goal.     Restrictions/Precautions:  [] No blood pressure/blood draw in: [x] Left Upper Extremity     [] Right Upper Extremity        [] Fall Risk       Intervention:   []Other    Pain:  [] No    [x] Yes  Location: Left upper body  Pain Rating (0-10 pain scale): 5/10  Pain Description: moderate, aching, heaviness, throbbing, tight (pulling), and uncomfortable  Interruption of Treatment [] Yes  [x] No    Came to Clinic:  [] Bandaged    []Unbandaged    [] With Stocking     [] With Sleeve     [] Kinesiotaped    [] [employer]    [x] With Alternative Compression: Compression bra (patient stating too tight), size E tubigrip    Skin Integrity:  [] Normal [] Dry  []Rough []Moist []Rash  Location of problem area/s:  [] Wound: Location/Description   [x] Fibrosis: Location/Description: left lateral breast, inferior breast and flank  [] Keratosis: Location/Description  [] Papillomas: Location/Description  [x] Hyperplasia   []Hyperkeratosis  [x]Hyperpigmentation: Left breast, scar areas and beneath the breast.   []Papillomas  []Lymphorrhea  [] Other    Edema:  Edema Location: Left upper extremity  [x] 1+ Edema [] 2+ Edema  [] 3+ Edema [] 4+ Edema  Edema Location: Left inferior breast  [] 1+ Edema [] 2+ Edema  [x] 3+ Edema [] 4+ Edema  Edema Location: Left axilla  [] 1+ Edema [x] 2+ Edema  [] 3+ Edema [] 4+ Edema    Subjective: Patient arrived to clinic with noted improvements to last session and reported better understanding and carry over at home with program.     Objective:  [] Measurement [x]Manual Lymph Drainage   []Bandaging   []Kinesiotaping [x] Education (1 month)       [x]Self Massage   []Self Bandaging [x] Exercise (3 weeks)   [x] Elevation (Since December 2021)  []Wound Care [x]Hygiene     [x] Other: education on compression pump    Lymphedema Measurements:  Lymphedema Upper Extremity Measurements     Measurements are made in 4cm increments and are circumferential.    CM Follow up #4  Left -  Follow up #3  Left - Follow up #2  Left -  Follow up #1  Left - 3/2/23 Eval Left  1/18/2023               Follow up #4  Right -  Follow up #3  Right -  Follow up #2  Right -  Follow up #1 Right -  3/2/23  Eval Right    1/18/2023               palm        21.5 21        21.5 20   wrist        18.5 17.5        17 16.5   4cm        18 18.5        19.5 19   8cm        19.5 20        21.5 22   12cm        21.5 22.5        24 25.25   16cm        25 27.25        28 28.75   20cm        29 29.5        31 31.25   24cm        29 29.25        31.5 30.25   28cm        31.75 35        33.5 34.5   32cm        38.5 41.25        39 39   36cm        41.25 42        43 41.5   40cm        43.5 46.25        43.75 44.25   44cm        46.25 47.5        45 45.75   Chest        128.25 130.5             Abdominals        131.25 131.5                Fingers are measured in cm and between mp and pip.   Digit Follow up #4  Left -  Follow up #3  Left -  Follow up #2  Left -  Follow up #1  Left -   3/2/23 Evaluation  Left -   1/18/2023             Follow up #4  Right -  Follow up #3  Right -  Follow up #2  Right -  Follow up #1  Right -   3/2/23 Evaluation Right -   1/18/2023               First Digit        6.5 6.5        6.5 6.25   Second Digit        6.5 6.5        6.5 6.25   Third Digit        6 6        6 6   Fourth Digit        6 6        6 5.75   Fifth Digit        5.5 5.75        5.5 5.5      ROM #1: 1/18/2023              R flex:            165°                        ABD:132°                                                       L flex: 137°                ABD:150° ROM #2: 3/2/23  R flex: 165                            ABD: 159                                                  L flex: 146                           ABD:165   ROM #3:   R flex:                             ABD:                                                   L flex:                            ABD: ROM #4:   R flex:                             ABD: L flex:                            ABD:      Assessment:  Knowledge of home program:   [] Good [x] Fair  [] Poor  Patient is programming at home:             [x] Yes  [] No  Family is assisting with programming: [] Yes  [x] No  Home programming is consistent:             [x] Yes -but limited with reach [] No  Other:   Response to treatment: Patient showing a good response to treatment and is eager to get fitted for compression bra and receive pump. Instructions for patient:   [x] Verbal [] Written  Instructions addressed: Patient educated to work on sequence throughout the weekend.     Time In: 1205            Time Out: 1300                      Timed Code Treatment Minutes: 55 minutes    CODE  Minutes  Units   58633 OT Eval Low     06496 OT Eval Medium     85291 OT Eval High     84462 Fluidotherapy     64914 Manual 25 2   25003 Therapeutic Ex     38213 Therapeutic Activity 30 2   47054 ADL/COMP Tech Train     70259 Neuromuscular Re-Ed     13151 OrthoManagementTraining     58554 Paraffin     58775 Electrical Stim - Attended     77954 Iontophoresis     12894 Ultrasound      Other     Total  55 4     Plan: Continue to address:  []Bandaging  [] Self Bandaging/Family Assist  [x]MLD  [x]Self Massage  [x]Exercise  [x]Education  [x]Obtain referral for garments  []Medical Hold  []Discharge to [address] [facility], [address] [place] [address] Rd

## 2023-03-07 ENCOUNTER — HOSPITAL ENCOUNTER (OUTPATIENT)
Dept: OCCUPATIONAL THERAPY | Age: 58
Setting detail: THERAPIES SERIES
Discharge: HOME | End: 2023-03-07
Payer: COMMERCIAL

## 2023-03-07 PROCEDURE — 97530 THERAPEUTIC ACTIVITIES: CPT | Performed by: OCCUPATIONAL THERAPIST

## 2023-03-07 PROCEDURE — 97140 MANUAL THERAPY 1/> REGIONS: CPT | Performed by: OCCUPATIONAL THERAPIST

## 2023-03-07 PROCEDURE — 97110 THERAPEUTIC EXERCISES: CPT | Performed by: OCCUPATIONAL THERAPIST

## 2023-03-07 NOTE — PROGRESS NOTES
Occupational Therapy      OCCUPATIONAL THERAPY LYMPHEDEMA TREATMENT NOTE  Phone: [phone]  Fax: [phone]     Date:  3/7/2023  Initial Evaluation Date: 2023                  Evaluating Therapist: AGUILAR Andrews CLT     Patient Name:  Soila Ridley                :  1965     Restrictions/Precautions:  Left breast, fall risk, BMI 45.55  Diagnosis:  Ductal carcinoma in situ (DCIS) of left breast with microinvasive component ([facility] [address] [C50.912]                                                          Date of Surgery/Injury: CTV Left Breast +tb from 22-3/2/22 completing [id number]     Insurance/Certification information:  [employer] blue access PPO  Plan of care signed (Y/N): Yes-23  Visit# / total visits: Evaluation + 8 treatment    Referring Practitioner/NPI#:  Mike Camejo MD; [id number]  Specific Practitioner Orders: Evaluation and treatment    Assessment of current deficits   [x]Pain  []Skin Integrity   [x]Lymphedema   []Functional transfers/mobility   []ADLs   []Strength    []Cognition  []IADLs   []Safety Awareness   []  Motor Endurance    []Fine Motor Coordination   []Balance   []Vision/perception  []Sensation []Gross Motor Coordination  [x]ROM     OT PLAN OF CARE   OT POC based on physician orders, patient diagnosis and results of clinical assessment    Frequency/Duration: 1-2x/week for 12 treatment sessions from 23 through 23   Projected units Requested:48  Approved days/units: 25 visits approved    Specific OT Treatment to include: 92198, 42877, 87390, 79319  Plan of Care:     [x]97140-Manual Lymph Drainage and Combined Decongestive Therapy  [x]89436- Skilled Multilayer Short Stretch Compression Bandaging/ Therapeutic Exercise  [x]Skin Care Education [x]HEP including Self MLD Education and/or Self Bandaging  [x]Education for Lymphedema Risks/Precautions     [x] Caregiver Education   []other:      Patient Specific Goal: Decreased pain and swelling of left breast    1-Patient will demonstrate good knowledge and understanding for lymphedema precautions, skin care and self-management to decrease progression of lymphedema and risk of infection. 3/7: Pt presents with good understanding of lymphedema treatment and/or precautions, skin care or infection prevention. Previously provided and reviewed handouts on risk reduction, travel, therapist training, exercise, breast cancer related lymphedema screening and treatments. Goal met at this time. 2-Patient will present with decreased limb volume in the involved extremity from moderate to min for improved indep in client centered tasks. 3/7: Upon arrival patient still showing swelling through axilla, flank, and breast. Patient still attempting to complete manual lymphatic massage on herself, but reports min results overall. Both therapist and patient do notice a significant improvement this session compared to last week as far as fluid reduction. Completed MLD sequence including short neck sequence, L axilla, abdominal sequence, axillary-inguinal and left breast to improve tissue extensibility; increase range of motion; induce relaxation; mobilize or manipulate soft tissue and joints; modulate pain; and reduce edema for carry over into self care and client centered tasks. Pt with fair understanding of sequence. Noted edema at scar area of inferior breast and medial breast.  Will continue to work with patient to increase understanding and reduce swelling through areas on left side. 3-Patient will demonstrate compliance with compression therapy for independent management of lymphedema. 3/7: Information sent previously to DME to fit and order a compression bra for patient. Pt missed fitting this past week, but re-scheduled fitting for this Friday. Will wait on approval from insurance for flexitouch compression pump. Faxed PAC form for financial assistance, but have not heard back.  Will continue to work with DME to obtain proper compression. Provided with size E tubi  to L UE to provide overall compression due to reduction noted with measurements, but pt reported it being too tight. Provided with a larger size and better comfort. Pt with fair tolerance and reporting tightness to the upper arm. Possible to recommend sleeve prior to DC. To continue addressing. 4-Pt will perform HEP with min assistance to reduce swelling and improve ROM in left upper extremity flex/abduction to 160° degrees for participation in ADLs and client centered tasks. 3/7: Contimued pt education on HEP for promotion of lymphatic drainage, decrease pain and to increase AROM. Pt completed cervical and scapular exercises 10xs each with good tolerance. Provided with hand out and demonstrated each exercise. Educated pt to complete exercises only after SMLD has been performed for energy conservation and highest impact of program. Good understanding. Completed only supine exercises this session 1-6 5xs each with 5 second hold each stretch with fair tolerance overall. Educated pt to slowly built tolerance. Limited with pain and ROM in abduction. Will continue to work with patient to improve ROM and incorporate exercises. LT weeks  1-Patient will be independent with self-management of lymphedema including understanding garment wear schedule, self-compression bandaging, HEP and self-care. Working towards goal. Denied from insurance for [employer], but currently in appeal process. 2-Patient will be fitted for appropriate compression garments for long term management of lymphedema. Fax sent to DME for patient to become properly fitted for a compression bra and possible arm sleeve. Will wait for patient to receive a phone call from DME. 3- Patient will present with decreased limb volume in the involved extremity from min to trace  for improved indep in client centered tasks.     Will continue to work towards goal.     Restrictions/Precautions:  [] No blood pressure/blood draw in: [x] Left Upper Extremity     [] Right Upper Extremity        [] Fall Risk       Intervention:   []Other    Pain:  [] No    [x] Yes  Location: Left upper body  Pain Rating (0-10 pain scale): 3/10  Pain Description:aching, heaviness, throbbing, tight (pulling), and uncomfortable  Interruption of Treatment [] Yes  [x] No    Came to Clinic:  [] Bandaged    []Unbandaged    [] With Stocking     [] With Sleeve     [] Kinesiotaped    [] Unna Boot    [x] With Alternative Compression: Compression bra (patient stating too tight), size E tubigrip    Skin Integrity:  [] Normal [] Dry  []Rough []Moist []Rash  Location of problem area/s:  [] Wound: Location/Description   [x] Fibrosis: Location/Description: left lateral breast, inferior breast and flank  [] Keratosis: Location/Description  [] Papillomas: Location/Description  [x] Hyperplasia   []Hyperkeratosis  [x]Hyperpigmentation: Left breast, scar areas and beneath the breast.   []Papillomas  []Lymphorrhea  [] Other    Edema:  Edema Location: Left upper extremity  [x] 1+ Edema [] 2+ Edema  [] 3+ Edema [] 4+ Edema  Edema Location: Left inferior breast  [] 1+ Edema [x] 2+ Edema  [] 3+ Edema [] 4+ Edema  Edema Location: Left axilla  [] 1+ Edema [x] 2+ Edema  [] 3+ Edema [] 4+ Edema    Subjective: Patient arrived to clinic with noted improvements to last session and reported better understanding and carry over at home with program.     Objective:  [] Measurement [x]Manual Lymph Drainage   []Bandaging   []Kinesiotaping [x] Education (1 month)       [x]Self Massage   []Self Bandaging [x] Exercise (3 weeks)   [x] Elevation (Since December 2021)  []Wound Care [x]Hygiene     [x] Other: education on compression pump    Lymphedema Measurements:  Lymphedema Upper Extremity Measurements     Measurements are made in 4cm increments and are circumferential.    CM Follow up #4  Left -  Follow up #3  Left -  Follow up #2  Left -  Follow up #1  Left - 3/2/23 Lobo  Left  1/18/2023               Follow up #4  Right -  Follow up #3  Right -  Follow up #2  Right -  Follow up #1 Right -  3/2/23  Eval Right    1/18/2023               palm        21.5 21        21.5 20   wrist        18.5 17.5        17 16.5   4cm        18 18.5        19.5 19   8cm        19.5 20        21.5 22   12cm        21.5 22.5        24 25.25   16cm        25 27.25        28 28.75   20cm        29 29.5        31 31.25   24cm        29 29.25        31.5 30.25   28cm        31.75 35        33.5 34.5   32cm        38.5 41.25        39 39   36cm        41.25 42        43 41.5   40cm        43.5 46.25        43.75 44.25   44cm        46.25 47.5        45 45.75   Chest        128.25 130.5             Abdominals        131.25 131.5                Fingers are measured in cm and between mp and pip.   Digit Follow up #4  Left -  Follow up #3  Left -  Follow up #2  Left -  Follow up #1  Left -   3/2/23 Evaluation  Left -   1/18/2023             Follow up #4  Right -  Follow up #3  Right -  Follow up #2  Right -  Follow up #1  Right -   3/2/23 Evaluation Right -   1/18/2023               First Digit        6.5 6.5        6.5 6.25   Second Digit        6.5 6.5        6.5 6.25   Third Digit        6 6        6 6   Fourth Digit        6 6        6 5.75   Fifth Digit        5.5 5.75        5.5 5.5      ROM #1: 1/18/2023              R flex:            165°                        ABD:132°                                                       L flex: 137°                ABD:150° ROM #2: 3/2/23  R flex: 165                            ABD: 159                                                  L flex: 146                           ABD:165   ROM #3:   R flex:                             ABD:                                                   L flex:                            ABD: ROM #4:   R flex:                             ABD:                                                   L flex:                            ABD: Assessment:  Knowledge of home program:   [] Good [x] Fair+  [] Poor  Patient is programming at home:             [x] Yes  [] No  Family is assisting with programming: [] Yes  [x] No  Home programming is consistent:             [x] Yes -but limited with reach [] No  Other:   Response to treatment: Patient showing a good response to treatment and is eager to get fitted for compression bra and receive pump. Instructions for patient:   [x] Verbal [] Written  Instructions addressed: Patient educated to work on sequence throughout the weekend.     Time In: 1205            Time Out: 1300                      Timed Code Treatment Minutes: 55 minutes    CODE  Minutes  Units   39897 OT Eval Low     38328 OT Eval Medium     16752 OT Eval High     78857 Fluidotherapy     36264 Manual 25 2   22985 Therapeutic Ex 15 1   00035 Therapeutic Activity 15 1   06797 ADL/COMP Tech Train     99621 Neuromuscular Re-Ed     93428 OrthoManagementTraining     53699 Paraffin     76696 Electrical Stim - Attended     68939 Iontophoresis     28637 Ultrasound      Other     Total  55 4     Plan: Continue to address:  []Bandaging  [] Self Bandaging/Family Assist  [x]MLD  [x]Self Massage  [x]Exercise  [x]Education  [x]Obtain referral for garments  []Medical Hold  [][place] [facility] [facility] [place], [address] [place] [address] Rd

## 2023-03-09 ENCOUNTER — HOSPITAL ENCOUNTER (OUTPATIENT)
Dept: OCCUPATIONAL THERAPY | Age: 58
Setting detail: THERAPIES SERIES
Discharge: HOME | End: 2023-03-09
Payer: COMMERCIAL

## 2023-03-09 PROCEDURE — 97110 THERAPEUTIC EXERCISES: CPT | Performed by: OCCUPATIONAL THERAPIST

## 2023-03-09 PROCEDURE — 97530 THERAPEUTIC ACTIVITIES: CPT | Performed by: OCCUPATIONAL THERAPIST

## 2023-03-09 PROCEDURE — 97140 MANUAL THERAPY 1/> REGIONS: CPT | Performed by: OCCUPATIONAL THERAPIST

## 2023-03-09 NOTE — PROGRESS NOTES
Occupational Therapy      OCCUPATIONAL THERAPY LYMPHEDEMA TREATMENT NOTE  Phone: [phone]  Fax: [phone]     Date:  3/9/2023  Initial Evaluation Date: 2023                  Evaluating Therapist: PETE Pinon/EMMY OMER     Patient Name:  Jody Castellon                :  1965     Restrictions/Precautions:  Left breast, fall risk, BMI 45.55  Diagnosis:  Ductal carcinoma in situ (DCIS) of left breast with microinvasive component ([facility] [address] [C50.912]                                                          Date of Surgery/Injury: CTV Left Breast +tb from 22-3/2/22 completing [id number]/[id number]     Insurance/Certification information:  [place] blue access PPO  Plan of care signed (Y/N): Yes-23  Visit# / total visits: Evaluation + 9 treatment    Referring Practitioner/NPI#:  Santos Barnard MD; [id number]  Specific Practitioner Orders: Evaluation and treatment    Assessment of current deficits   [x]Pain  []Skin Integrity   [x]Lymphedema   []Functional transfers/mobility   []ADLs   []Strength    []Cognition  []IADLs   []Safety Awareness   []  Motor Endurance    []Fine Motor Coordination   []Balance   []Vision/perception  []Sensation []Gross Motor Coordination  [x]ROM     OT PLAN OF CARE   OT POC based on physician orders, patient diagnosis and results of clinical assessment    Frequency/Duration: 1-2x/week for 12 treatment sessions from 23 through 23   Projected units Requested:48  Approved days/units: 25 visits approved    Specific OT Treatment to include: 42035, 31162, 88193, 83304  Plan of Care:     [x]97140-Manual Lymph Drainage and Combined Decongestive Therapy  [x]82781- Skilled Multilayer Short Stretch Compression Bandaging/ Therapeutic Exercise  [x]Skin Care Education [x]HEP including Self MLD Education and/or Self Bandaging  [x]Education for Lymphedema Risks/Precautions     [x] Caregiver Education   []other:      Patient Specific Goal: Decreased pain and swelling of left breast    1-Patient will demonstrate good knowledge and understanding for lymphedema precautions, skin care and self-management to decrease progression of lymphedema and risk of infection. 3/7: Pt presents with good understanding of lymphedema treatment and/or precautions, skin care or infection prevention. Previously provided and reviewed handouts on risk reduction, travel, therapist training, exercise, breast cancer related lymphedema screening and treatments. Goal met at this time. 2-Patient will present with decreased limb volume in the involved extremity from moderate to min for improved indep in client centered tasks. 3/9: Upon arrival patient still showing swelling through axilla, flank, and breast. Patient still attempting to complete manual lymphatic massage on herself, but reports min results overall. Both therapist and patient do notice a significant improvement this session compared to last week as far as fluid reduction. Completed MLD sequence including short neck sequence, L axilla, abdominal sequence, axillary-inguinal and left breast to improve tissue extensibility; increase range of motion; induce relaxation; mobilize or manipulate soft tissue and joints; modulate pain; and reduce edema for carry over into self care and client centered tasks. Pt with fair+ understanding of sequence. Noted edema at scar area of inferior breast and medial breast. Recommending an advanced pump at this time with trial already completed. Will continue to work with patient to increase understanding and reduce swelling through areas on left side. 3-Patient will demonstrate compliance with compression therapy for independent management of lymphedema. 3/9: Information sent previously to DME to fit and order a compression bra for patient. Pt missed fitting this past week, but re-scheduled fitting for this Friday. Will wait on approval from insurance for flexitouch compression pump.  Faxed PAC form for financial assistance, but have not heard back. Will continue to work with DME to obtain proper compression. Provided with size E tubi  to L UE to provide overall compression due to reduction noted with measurements, but pt reported it being too tight. Provided with a larger size and better comfort, but decreased fit at wrist. Recommending compression sleeve prior to DC with referral sent to [facility]. Lite Arm sleeve, 29-24AEIB, silicone top band, size med; long length; color-Black. To continue addressing. 4-Pt will perform HEP with min assistance to reduce swelling and improve ROM in left upper extremity flex/abduction to 160° degrees for participation in ADLs and client centered tasks. 3/9: Contimued pt education on HEP for promotion of lymphatic drainage, decrease pain and to increase AROM. Pt completed cervical and scapular exercises 10xs each with good tolerance. Provided with hand out and demonstrated each exercise. Educated pt to complete exercises only after SMLD has been performed for energy conservation and highest impact of program. Good understanding. Completed only supine exercises this session 1-7 5xs each with 5 second hold each stretch with fair tolerance overall. Educated pt to slowly built tolerance. Limited with pain and ROM in abduction. Will continue to work with patient to improve ROM and incorporate exercises. LT weeks  1-Patient will be independent with self-management of lymphedema including understanding garment wear schedule, self-compression bandaging, HEP and self-care. Working towards goal. Denied from insurance for [employer], but currently in appeal process. 2-Patient will be fitted for appropriate compression garments for long term management of lymphedema. Fax sent to DME for patient to become properly fitted for a compression bra and arm sleeve. Will wait for patient to receive a phone call from DME.    3- Patient will present with decreased limb volume in the involved extremity from min to trace  for improved indep in client centered tasks. Will continue to work towards goal.     Restrictions/Precautions:  [] No blood pressure/blood draw in: [x] Left Upper Extremity     [] Right Upper Extremity        [] Fall Risk       Intervention:   []Other    Pain:  [] No    [x] Yes  Location: Left upper body  Pain Rating (0-10 pain scale): 3/10  Pain Description:aching, throbbing, tight (pulling), and uncomfortable  Interruption of Treatment [] Yes  [x] No    Came to Clinic:  [] Bandaged    []Unbandaged    [] With Stocking     [] With Sleeve     [] Kinesiotaped    [] [employer]    [x] With Alternative Compression: Compression bra (patient stating too tight), size E tubigrip    Skin Integrity:  [] Normal [] Dry  []Rough [x]Moist-inferior breast-left []Rash  Location of problem area/s:  [] Wound: Location/Description   [x] Fibrosis: Location/Description: left lateral breast, inferior breast and flank  [] Keratosis: Location/Description  [] Papillomas: Location/Description  [x] Hyperplasia   []Hyperkeratosis  [x]Hyperpigmentation: Left breast, scar areas and beneath the breast.   []Papillomas  []Lymphorrhea  [] Other    Edema:  Edema Location: Left upper extremity  [x] 1+ Edema [] 2+ Edema  [] 3+ Edema [] 4+ Edema  Edema Location: Left inferior breast  [] 1+ Edema [x] 2+ Edema  [] 3+ Edema [] 4+ Edema  Edema Location: Left axilla  [] 1+ Edema [x] 2+ Edema  [] 3+ Edema [] 4+ Edema    Subjective: Patient arrived to clinic with noted improvements to last session and reported better understanding and carry over at home with program. To get fitted for bras tomorrow.       Objective:  [] Measurement [x]Manual Lymph Drainage   []Bandaging   []Kinesiotaping [x] Education (1 month)       [x]Self Massage   []Self Bandaging [x] Exercise (3 weeks)   [x] Elevation (Since December 2021)  []Wound Care [x]Hygiene     [x] Other: education on compression pump    Lymphedema Measurements:  Lymphedema Upper Extremity Measurements     Measurements are made in 4cm increments and are circumferential.    CM Follow up #4  Left -  Follow up #3  Left -  Follow up #2  Left -  Follow up #1  Left - 3/2/23 Eval Left  1/18/2023               Follow up #4  Right -  Follow up #3  Right -  Follow up #2  Right -  Follow up #1 Right -  3/2/23  Eval Right    1/18/2023               palm        21.5 21        21.5 20   wrist        18.5 17.5        17 16.5   4cm        18 18.5        19.5 19   8cm        19.5 20        21.5 22   12cm        21.5 22.5        24 25.25   16cm        25 27.25        28 28.75   20cm        29 29.5        31 31.25   24cm        29 29.25        31.5 30.25   28cm        31.75 35        33.5 34.5   32cm        38.5 41.25        39 39   36cm        41.25 42        43 41.5   40cm        43.5 46.25        43.75 44.25   44cm        46.25 47.5        45 45.75   Chest        128.25 130.5             Abdominals        131.25 131.5                Fingers are measured in cm and between mp and pip.   Digit Follow up #4  Left -  Follow up #3  Left -  Follow up #2  Left -  Follow up #1  Left -   3/2/23 Evaluation  Left -   1/18/2023             Follow up #4  Right -  Follow up #3  Right -  Follow up #2  Right -  Follow up #1  Right -   3/2/23 Evaluation Right -   1/18/2023               First Digit        6.5 6.5        6.5 6.25   Second Digit        6.5 6.5        6.5 6.25   Third Digit        6 6        6 6   Fourth Digit        6 6        6 5.75   Fifth Digit        5.5 5.75        5.5 5.5      ROM #1: 1/18/2023              R flex:            165°                        ABD:132°                                                       L flex: 137°                ABD:150° ROM #2: 3/2/23  R flex: 165                            ABD: 159                                                  L flex: 146                           ABD:165   ROM #3:   R flex:                             ABD: L flex:                            ABD: ROM #4:   R flex:                             ABD:                                                   L flex:                            ABD:      Assessment:  Knowledge of home program:   [] Good [x] Fair+  [] Poor  Patient is programming at home:             [x] Yes  [] No  Family is assisting with programming: [] Yes  [x] No  Home programming is consistent:             [x] Yes -but limited with reach [] No  Other:   Response to treatment: Patient showing a good response to treatment and is eager to get fitted for compression bra, sleeve and receive pump. Instructions for patient:   [x] Verbal [] Written  Instructions addressed: Patient educated to work on sequence throughout the weekend.     Time In: 1205            Time Out: 1300                      Timed Code Treatment Minutes: 55 minutes    CODE  Minutes  Units   68306 OT Eval Low     23063 OT Eval Medium     75401 OT Eval High     38735 Fluidotherapy     86862 Manual 25 2   17162 Therapeutic Ex 15 1   78835 Therapeutic Activity 15 1   85174 ADL/COMP Tech Train     65718 Neuromuscular Re-Ed     39630 OrthoManagementTraining     51572 Paraffin     67122 Electrical Stim - Attended     10968 Iontophoresis     38477 Ultrasound      Other     Total  55 4     Plan: Continue to address:  []Bandaging  [] Self Bandaging/Family Assist  [x]MLD  [x]Self Massage  [x]Exercise  [x]Education  [x]Obtain referral for garments  []Medical Hold  []Discharge to [address] [place] [address] Rd

## 2023-03-14 ENCOUNTER — HOSPITAL ENCOUNTER (OUTPATIENT)
Dept: OCCUPATIONAL THERAPY | Age: 58
Setting detail: THERAPIES SERIES
Discharge: HOME | End: 2023-03-14
Payer: COMMERCIAL

## 2023-03-14 PROCEDURE — 97530 THERAPEUTIC ACTIVITIES: CPT | Performed by: OCCUPATIONAL THERAPIST

## 2023-03-14 PROCEDURE — 97110 THERAPEUTIC EXERCISES: CPT | Performed by: OCCUPATIONAL THERAPIST

## 2023-03-14 PROCEDURE — 97140 MANUAL THERAPY 1/> REGIONS: CPT | Performed by: OCCUPATIONAL THERAPIST

## 2023-03-14 NOTE — PROGRESS NOTES
Occupational Therapy      OCCUPATIONAL THERAPY LYMPHEDEMA TREATMENT NOTE  Phone: [phone]  Fax: [phone]     Date:  3/14/2023  Initial Evaluation Date: 2023                  Evaluating Therapist: PETE Blanco/L, CLT     Patient Name:  Kyleigh Brantley                :  1965     Restrictions/Precautions:  Left breast, fall risk, BMI 45.55  Diagnosis:  Ductal carcinoma in situ (DCIS) of left breast with microinvasive component ([facility]) [C50.912]                                                          Date of Surgery/Injury: CTV Left Breast +tb from 22-3/2/22 completing 21fx/5256cGy     Insurance/Certification information:  [employer] access PPO  Plan of care signed (Y/N): Yes-23  Visit# / total visits: Evaluation + 10 treatment    Referring Practitioner/NPI#:  Radha Fuentes MD; [id number]  Specific Practitioner Orders: Evaluation and treatment    Assessment of current deficits   [x]Pain  []Skin Integrity   [x]Lymphedema   []Functional transfers/mobility   []ADLs   []Strength    []Cognition  []IADLs   []Safety Awareness   []  Motor Endurance    []Fine Motor Coordination   []Balance   []Vision/perception  []Sensation []Gross Motor Coordination  [x]ROM     OT PLAN OF CARE   OT POC based on physician orders, patient diagnosis and results of clinical assessment    Frequency/Duration: 1-2x/week for 12 treatment sessions from 23 through 23   Projected units Requested:48  Approved days/units: 25 visits approved    Specific OT Treatment to include: 73141, 50159, 92872, 13307  Plan of Care:     [x]97140-Manual Lymph Drainage and Combined Decongestive Therapy  [x]61918- Skilled Multilayer Short Stretch Compression Bandaging/ Therapeutic Exercise  [x]Skin Care Education [x]HEP including Self MLD Education and/or Self Bandaging  [x]Education for Lymphedema Risks/Precautions     [x] Caregiver Education   []other:      Patient Specific Goal: Decreased pain and swelling of left  breast    1-Patient will demonstrate good knowledge and understanding for lymphedema precautions, skin care and self-management to decrease progression of lymphedema and risk of infection.  3/7: Pt presents with good understanding of lymphedema treatment and/or precautions, skin care or infection prevention. Previously provided and reviewed handouts on risk reduction, travel, therapist training, exercise, breast cancer related lymphedema screening and treatments. Goal met at this time.     2-Patient will present with decreased limb volume in the involved extremity from moderate to min for improved indep in client centered tasks.   3/14: Upon arrival patient still showing swelling through axilla, flank, and breast. Patient still attempting to complete manual lymphatic massage on herself, but reports min results overall. Completed MLD sequence including short neck sequence, L axilla, abdominal sequence, axillary-inguinal and left breast to improve tissue extensibility; increase range of motion; induce relaxation; mobilize or manipulate soft tissue and joints; modulate pain; and reduce edema for carry over into self care and client centered tasks. Pt with fair+ understanding of sequence. Noted edema at scar area of inferior breast and medial breast. Recommending an advanced pump at this time with trial already completed, but insurance declining claim. Faxed PAC form for financial assistance, but have not heard back. Expressed to patient the importance of self MLD.     Will continue to work with patient to increase understanding and reduce swelling through areas on left side.   3-Patient will demonstrate compliance with compression therapy for independent management of lymphedema.  3/14: Pt was fitted for and received 2/3 bras. Fit is not perfect with straps too long when on tightest setting. Pt was indep to doff and don with trial and error. Educated pt on proper fit and adjusted cups for fit and compression. Provided  with size E tubi  to L UE to provide overall compression due to reduction noted with measurements, but pt reported it being too tight. Provided with a larger size and better comfort, but decreased fit at wrist. Recommending compression sleeve prior to DC with referral sent to [facility]. [facility]. Lite Arm sleeve, [id number], silicone top band, size med; long length; color-Black. To continue addressing. 4-Pt will perform HEP with min assistance to reduce swelling and improve ROM in left upper extremity flex/abduction to 160° degrees for participation in ADLs and client centered tasks. 3/14: Contimued pt education on HEP for promotion of lymphatic drainage, decrease pain and to increase AROM. Pt completed cervical and scapular exercises 10xs each with good tolerance. Provided with hand out and demonstrated each exercise. Educated pt to complete exercises only after SMLD has been performed for energy conservation and highest impact of program. Good understanding. Completed supine and sitting exercises this session 1-10 5xs each with 5 second hold each stretch with fair tolerance overall. Educated pt to slowly built tolerance. Limited with pain and ROM in abduction. Will continue to work with patient to improve ROM and incorporate exercises. LT weeks  1-Patient will be independent with self-management of lymphedema including understanding garment wear schedule, self-compression bandaging, HEP and self-care. Working towards goal. Denied from insurance for [employer], but currently in appeal process. 2-Patient will be fitted for appropriate compression garments for long term management of lymphedema. Fax sent to DME for patient to become properly fitted for a compression bra and arm sleeve. Will wait for patient to receive a phone call from DME. 3- Patient will present with decreased limb volume in the involved extremity from min to trace  for improved indep in client centered tasks.     Will continue to work towards goal.     Restrictions/Precautions:  [] No blood pressure/blood draw in: [x] Left Upper Extremity     [] Right Upper Extremity        [] Fall Risk       Intervention:   []Other    Pain:  [] No    [x] Yes  Location: Left upper body  Pain Rating (0-10 pain scale): 3/10  Pain Description:aching, throbbing, tight (pulling), and uncomfortable  Interruption of Treatment [] Yes  [x] No    Came to Clinic:  [] Bandaged    []Unbandaged    [] With Stocking     [] With Sleeve     [] Kinesiotaped    [] [employer]    [x] With Alternative Compression: Compression bra (patient stating too tight), size E tubigrip    Skin Integrity:  [] Normal [] Dry  []Rough [x]Moist-inferior breast-left []Rash  Location of problem area/s:  [] Wound: Location/Description   [x] Fibrosis: Location/Description: left lateral breast, inferior breast and flank  [] Keratosis: Location/Description  [] Papillomas: Location/Description  [x] Hyperplasia   []Hyperkeratosis  [x]Hyperpigmentation: Left breast, scar areas and beneath the breast.   []Papillomas  []Lymphorrhea  [] Other    Edema:  Edema Location: Left upper extremity  [x] 1+ Edema [] 2+ Edema  [] 3+ Edema [] 4+ Edema  Edema Location: Left inferior breast  [] 1+ Edema [x] 2+ Edema  [] 3+ Edema [] 4+ Edema  Edema Location: Left axilla  [] 1+ Edema [x] 2+ Edema  [] 3+ Edema [] 4+ Edema    Subjective: Patient arrived to clinic with noted improvements to last session and reported better understanding and carry over at home with program. To get fitted for bras tomorrow.       Objective:  [] Measurement [x]Manual Lymph Drainage   []Bandaging   []Kinesiotaping [x] Education (1 month)       [x]Self Massage   []Self Bandaging [x] Exercise (3 weeks)   [x] Elevation (Since December 2021)  []Wound Care [x]Hygiene     [x] Other: education on compression pump    Lymphedema Measurements:  Lymphedema Upper Extremity Measurements     Measurements are made in 4cm increments and are circumferential.    CM Follow up #4  Left -  Follow up #3  Left -  Follow up #2  Left -  Follow up #1  Left - 3/2/23 Eval Left  1/18/2023               Follow up #4  Right -  Follow up #3  Right -  Follow up #2  Right -  Follow up #1 Right -  3/2/23  Eval Right    1/18/2023               palm        21.5 21        21.5 20   wrist        18.5 17.5        17 16.5   4cm        18 18.5        19.5 19   8cm        19.5 20        21.5 22   12cm        21.5 22.5        24 25.25   16cm        25 27.25        28 28.75   20cm        29 29.5        31 31.25   24cm        29 29.25        31.5 30.25   28cm        31.75 35        33.5 34.5   32cm        38.5 41.25        39 39   36cm        41.25 42        43 41.5   40cm        43.5 46.25        43.75 44.25   44cm        46.25 47.5        45 45.75   Chest        128.25 130.5             Abdominals        131.25 131.5                Fingers are measured in cm and between mp and pip.   Digit Follow up #4  Left -  Follow up #3  Left -  Follow up #2  Left -  Follow up #1  Left -   3/2/23 Evaluation  Left -   1/18/2023             Follow up #4  Right -  Follow up #3  Right -  Follow up #2  Right -  Follow up #1  Right -   3/2/23 Evaluation Right -   1/18/2023               First Digit        6.5 6.5        6.5 6.25   Second Digit        6.5 6.5        6.5 6.25   Third Digit        6 6        6 6   Fourth Digit        6 6        6 5.75   Fifth Digit        5.5 5.75        5.5 5.5      ROM #1: 1/18/2023              R flex:            165°                        ABD:132°                                                       L flex: 137°                ABD:150° ROM #2: 3/2/23  R flex: 165                            ABD: 159                                                  L flex: 146                           ABD:165   ROM #3:   R flex:                             ABD:                                                   L flex:                            ABD: ROM #4:   R flex: ABD:                                                   L flex:                            ABD:      Assessment:  Knowledge of home program:   [] Good [x] Fair+  [] Poor  Patient is programming at home:             [x] Yes  [] No  Family is assisting with programming: [] Yes  [x] No  Home programming is consistent:             [x] Yes -but limited with reach [] No  Other:   Response to treatment: Patient showing a good response to treatment and is eager to get fitted for compression bra, sleeve and receive pump. Instructions for patient:   [x] Verbal [] Written  Instructions addressed: Patient educated to work on sequence throughout the weekend.     Time In: 1200            Time Out: 1300                      Timed Code Treatment Minutes: 60 minutes    CODE  Minutes  Units   64128 OT Eval Low     63253 OT Eval Medium     85874 OT Eval High     24960 Fluidotherapy     75293 Manual 25 2   67421 Therapeutic Ex 15 1   87908 Therapeutic Activity 20 1   62993 ADL/COMP Tech Train     52751 Neuromuscular Re-Ed     70193 OrthoManagementTraining     34555 Paraffin     69121 Electrical Stim - Attended     02859 Iontophoresis     59156 Ultrasound      Other     Total  60 4     Plan: Continue to address:  []Bandaging  [] Self Bandaging/Family Assist  [x]MLD  [x]Self Massage  [x]Exercise  [x]Education  [x]Obtain referral for garments  []Medical Hold  []Discharge to [facility], [address] [place] [address] Rd

## 2023-03-21 ENCOUNTER — HOSPITAL ENCOUNTER (OUTPATIENT)
Dept: OCCUPATIONAL THERAPY | Age: 58
Setting detail: THERAPIES SERIES
Discharge: HOME | End: 2023-03-21
Payer: COMMERCIAL

## 2023-03-21 PROCEDURE — 97530 THERAPEUTIC ACTIVITIES: CPT | Performed by: OCCUPATIONAL THERAPIST

## 2023-03-21 NOTE — PROGRESS NOTES
Occupational Therapy      OCCUPATIONAL THERAPY LYMPHEDEMA TREATMENT NOTE  Phone: [phone]  Fax: [phone]     Date:  3/21/2023  Initial Evaluation Date: 2023                  Evaluating Therapist: PETE Blanco/L, CLT     Patient Name:  Kyleigh Brantley                :  1965     Restrictions/Precautions:  Left breast, fall risk, BMI 45.55  Diagnosis:  Ductal carcinoma in situ (DCIS) of left breast with microinvasive component (HCC) [C50.912]                                                          Date of Surgery/Injury: CTV Left Breast +tb from 22-3/2/22 completing 21fx/5256cGy     Insurance/Certification information:  [place] blue access PPO  Plan of care signed (Y/N): Yes-23  Visit# / total visits: Evaluation + 11 treatment    Referring Practitioner/NPI#:  Radha Fuentes MD; [id number]  Specific Practitioner Orders: Evaluation and treatment    Assessment of current deficits   [x]Pain  []Skin Integrity   [x]Lymphedema   []Functional transfers/mobility   []ADLs   []Strength    []Cognition  []IADLs   []Safety Awareness   []  Motor Endurance    []Fine Motor Coordination   []Balance   []Vision/perception  []Sensation []Gross Motor Coordination  [x]ROM     OT PLAN OF CARE   OT POC based on physician orders, patient diagnosis and results of clinical assessment    Frequency/Duration: 1-2x/week for 12 treatment sessions from 23 through 23   Projected units Requested: 48  Approved days/units: 25 visits approved    Specific OT Treatment to include: 06355, 95429, 04034, 04505  Plan of Care:     [x]97140-Manual Lymph Drainage and Combined Decongestive Therapy  [x]94553- Skilled Multilayer Short Stretch Compression Bandaging/ Therapeutic Exercise  [x]Skin Care Education [x]HEP including Self MLD Education and/or Self Bandaging  [x]Education for Lymphedema Risks/Precautions     [x] Caregiver Education   []other:      Patient Specific Goal: Decreased pain and swelling of left

## 2023-04-06 ENCOUNTER — TELEPHONE (OUTPATIENT)
Dept: OCCUPATIONAL THERAPY | Age: 58
End: 2023-04-06

## 2023-04-06 NOTE — TELEPHONE ENCOUNTER
Therapist called patient to schedule follow up for lymphedema clinic. Therapist required to leave voice message. Therapist asked patient to call therapist to schedule.

## 2023-05-02 ENCOUNTER — TELEPHONE (OUTPATIENT)
Dept: OCCUPATIONAL THERAPY | Age: 58
End: 2023-05-02

## 2023-05-02 NOTE — TELEPHONE ENCOUNTER
Patient called outpatient clinic and cancelled scheduled lymphedema follow up appointment for 3May 2023. Therapist called to re schedule appointment and required to leave voice message for patient to return call to [facility] at [phone]) to reschedule.

## 2023-08-09 ENCOUNTER — TELEPHONE (OUTPATIENT)
Dept: OCCUPATIONAL THERAPY | Age: 58
End: 2023-08-09

## 2023-08-09 NOTE — TELEPHONE ENCOUNTER
Occupational Therapy      OCCUPATIONAL THERAPY LYMPHEDEMA DISCHARGE NOTE  *THIS NOTE TO ACT AS DC DUE TO PT CANCELLING LAST VISIT. VOICEMAIL LEFT TO EXPLAIN DC AND RECOMMENDING NEW ORDER IF NEEDED.   Phone: [phone]  Fax: [phone]     Date:  3/23/2023       Initial Evaluation Date: 2023                  Evaluating Therapist: PETE Layton/NEGRA, CLT     Patient Name:  Mckinley Yu                :  1965     Restrictions/Precautions:  Left breast, fall risk, BMI 45.55  Diagnosis:  Ductal carcinoma in situ (DCIS) of left breast with microinvasive component ([address]) [C50.912]                                                          Date of Surgery/Injury: CTV Left Breast +tb from 22-3/2/22 completing [id number]     Insurance/Certification information:  [place] blue access PPO  Plan of care signed (Y/N): Yes-23  Visit# / total visits: Evaluation + 11 treatment  No shows: 3    Referring Practitioner/NPI#:  Rebekah Lowe MD; [id number]  Specific Practitioner Orders: Evaluation and treatment    Assessment of current deficits   [x]Pain  []Skin Integrity   [x]Lymphedema   []Functional transfers/mobility   []ADLs   []Strength    []Cognition  []IADLs   []Safety Awareness   []  Motor Endurance    []Fine Motor Coordination   []Balance   []Vision/perception  []Sensation []Gross Motor Coordination  [x]ROM     OT PLAN OF CARE   OT POC based on physician orders, patient diagnosis and results of clinical assessment    Frequency/Duration: 1-2x/week for 12 treatment sessions from 23 through    RE-CERTIFICATION: Frequency/Duration: 1-2x/week AS NEEDED for 5 treatment sessions from 3/23/23 through 6/15/23   Approved days/units: 25 visits approved    Specific OT Treatment to include: 54790, 26456, 72101, 79831  Plan of Care:     [x]97140-Manual Lymph Drainage and Combined Decongestive Therapy  [x]57973- Skilled Multilayer Short Stretch Compression Bandaging/ Therapeutic Exercise  [x]Skin Care

## 2024-02-21 ENCOUNTER — APPOINTMENT (OUTPATIENT)
Dept: GENERAL RADIOLOGY | Age: 59
End: 2024-02-21
Payer: COMMERCIAL

## 2024-02-21 ENCOUNTER — APPOINTMENT (OUTPATIENT)
Dept: CT IMAGING | Age: 59
End: 2024-02-21
Payer: COMMERCIAL

## 2024-02-21 ENCOUNTER — HOSPITAL ENCOUNTER (EMERGENCY)
Age: 59
Discharge: ANOTHER HOSPITAL | End: 2024-02-22
Attending: EMERGENCY MEDICINE
Payer: COMMERCIAL

## 2024-02-21 DIAGNOSIS — E86.0 DEHYDRATION: ICD-10-CM

## 2024-02-21 DIAGNOSIS — J11.1 INFLUENZA: ICD-10-CM

## 2024-02-21 DIAGNOSIS — K92.2 GASTROINTESTINAL HEMORRHAGE, UNSPECIFIED GASTROINTESTINAL HEMORRHAGE TYPE: Primary | ICD-10-CM

## 2024-02-21 DIAGNOSIS — N17.9 AKI (ACUTE KIDNEY INJURY) (HCC): ICD-10-CM

## 2024-02-21 DIAGNOSIS — R19.7 DIARRHEA, UNSPECIFIED TYPE: ICD-10-CM

## 2024-02-21 DIAGNOSIS — R79.89 ELEVATED LFTS: ICD-10-CM

## 2024-02-21 LAB
ALBUMIN SERPL-MCNC: 5.1 G/DL (ref 3.5–5.2)
ALP SERPL-CCNC: 69 U/L (ref 35–104)
ALT SERPL-CCNC: 116 U/L (ref 0–32)
ANION GAP SERPL CALCULATED.3IONS-SCNC: 27 MMOL/L (ref 7–16)
AST SERPL-CCNC: 182 U/L (ref 0–31)
BACTERIA URNS QL MICRO: ABNORMAL
BASOPHILS # BLD: 0.06 K/UL (ref 0–0.2)
BASOPHILS NFR BLD: 2 % (ref 0–2)
BILIRUB SERPL-MCNC: 2.2 MG/DL (ref 0–1.2)
BILIRUB UR QL STRIP: ABNORMAL
BUN SERPL-MCNC: 18 MG/DL (ref 6–20)
CALCIUM SERPL-MCNC: 10.3 MG/DL (ref 8.6–10.2)
CASTS #/AREA URNS LPF: ABNORMAL /LPF
CASTS #/AREA URNS LPF: ABNORMAL /LPF
CHLORIDE SERPL-SCNC: 90 MMOL/L (ref 98–107)
CLARITY UR: CLEAR
CO2 SERPL-SCNC: 18 MMOL/L (ref 22–29)
COLOR UR: ABNORMAL
CREAT SERPL-MCNC: 1.7 MG/DL (ref 0.5–1)
EOSINOPHIL # BLD: 0.04 K/UL (ref 0.05–0.5)
EOSINOPHILS RELATIVE PERCENT: 1 % (ref 0–6)
EPI CELLS #/AREA URNS HPF: ABNORMAL /HPF
ERYTHROCYTE [DISTWIDTH] IN BLOOD BY AUTOMATED COUNT: 21.7 % (ref 11.5–15)
FLUAV RNA RESP QL NAA+PROBE: NOT DETECTED
FLUBV RNA RESP QL NAA+PROBE: DETECTED
GFR SERPL CREATININE-BSD FRML MDRD: 36 ML/MIN/1.73M2
GLUCOSE BLD-MCNC: 119 MG/DL (ref 74–99)
GLUCOSE SERPL-MCNC: 116 MG/DL (ref 74–99)
GLUCOSE UR STRIP-MCNC: NEGATIVE MG/DL
HCT VFR BLD AUTO: 28.6 % (ref 34–48)
HGB BLD-MCNC: 8.7 G/DL (ref 11.5–15.5)
HGB UR QL STRIP.AUTO: NEGATIVE
IMM GRANULOCYTES # BLD AUTO: <0.03 K/UL (ref 0–0.58)
IMM GRANULOCYTES NFR BLD: 1 % (ref 0–5)
KETONES UR STRIP-MCNC: >80 MG/DL
LACTATE BLDV-SCNC: 2.7 MMOL/L (ref 0.5–2.2)
LEUKOCYTE ESTERASE UR QL STRIP: NEGATIVE
LIPASE SERPL-CCNC: 96 U/L (ref 13–60)
LYMPHOCYTES NFR BLD: 1.12 K/UL (ref 1.5–4)
LYMPHOCYTES RELATIVE PERCENT: 29 % (ref 20–42)
MCH RBC QN AUTO: 21.5 PG (ref 26–35)
MCHC RBC AUTO-ENTMCNC: 30.4 G/DL (ref 32–34.5)
MCV RBC AUTO: 70.8 FL (ref 80–99.9)
MONOCYTES NFR BLD: 0.66 K/UL (ref 0.1–0.95)
MONOCYTES NFR BLD: 17 % (ref 2–12)
NEUTROPHILS NFR BLD: 51 % (ref 43–80)
NEUTS SEG NFR BLD: 1.95 K/UL (ref 1.8–7.3)
NITRITE UR QL STRIP: NEGATIVE
PH UR STRIP: 6 [PH] (ref 5–9)
PLATELET # BLD AUTO: 101 K/UL (ref 130–450)
PLATELET CONFIRMATION: NORMAL
PMV BLD AUTO: ABNORMAL FL (ref 7–12)
POTASSIUM SERPL-SCNC: 4.7 MMOL/L (ref 3.5–5)
PROT SERPL-MCNC: 8.7 G/DL (ref 6.4–8.3)
PROT UR STRIP-MCNC: 100 MG/DL
RBC # BLD AUTO: 4.04 M/UL (ref 3.5–5.5)
RBC # BLD: ABNORMAL 10*6/UL
RBC #/AREA URNS HPF: ABNORMAL /HPF
SARS-COV-2 RNA RESP QL NAA+PROBE: NOT DETECTED
SODIUM SERPL-SCNC: 135 MMOL/L (ref 132–146)
SOURCE: ABNORMAL
SP GR UR STRIP: >1.03 (ref 1–1.03)
SPECIMEN DESCRIPTION: ABNORMAL
TROPONIN I SERPL HS-MCNC: 14 NG/L (ref 0–9)
UROBILINOGEN UR STRIP-ACNC: 1 EU/DL (ref 0–1)
WBC #/AREA URNS HPF: ABNORMAL /HPF
WBC OTHER # BLD: 3.9 K/UL (ref 4.5–11.5)

## 2024-02-21 PROCEDURE — 83690 ASSAY OF LIPASE: CPT

## 2024-02-21 PROCEDURE — 74176 CT ABD & PELVIS W/O CONTRAST: CPT

## 2024-02-21 PROCEDURE — 96375 TX/PRO/DX INJ NEW DRUG ADDON: CPT

## 2024-02-21 PROCEDURE — 96365 THER/PROPH/DIAG IV INF INIT: CPT

## 2024-02-21 PROCEDURE — 6360000002 HC RX W HCPCS: Performed by: EMERGENCY MEDICINE

## 2024-02-21 PROCEDURE — 71046 X-RAY EXAM CHEST 2 VIEWS: CPT

## 2024-02-21 PROCEDURE — 2580000003 HC RX 258: Performed by: EMERGENCY MEDICINE

## 2024-02-21 PROCEDURE — 83605 ASSAY OF LACTIC ACID: CPT

## 2024-02-21 PROCEDURE — 96361 HYDRATE IV INFUSION ADD-ON: CPT

## 2024-02-21 PROCEDURE — 93005 ELECTROCARDIOGRAM TRACING: CPT | Performed by: NURSE PRACTITIONER

## 2024-02-21 PROCEDURE — 87636 SARSCOV2 & INF A&B AMP PRB: CPT

## 2024-02-21 PROCEDURE — 80053 COMPREHEN METABOLIC PANEL: CPT

## 2024-02-21 PROCEDURE — 82962 GLUCOSE BLOOD TEST: CPT

## 2024-02-21 PROCEDURE — 81001 URINALYSIS AUTO W/SCOPE: CPT

## 2024-02-21 PROCEDURE — 99285 EMERGENCY DEPT VISIT HI MDM: CPT

## 2024-02-21 PROCEDURE — 85025 COMPLETE CBC W/AUTO DIFF WBC: CPT

## 2024-02-21 PROCEDURE — 84484 ASSAY OF TROPONIN QUANT: CPT

## 2024-02-21 RX ORDER — METRONIDAZOLE 500 MG/100ML
500 INJECTION, SOLUTION INTRAVENOUS ONCE
Status: COMPLETED | OUTPATIENT
Start: 2024-02-21 | End: 2024-02-21

## 2024-02-21 RX ORDER — SODIUM CHLORIDE 9 MG/ML
INJECTION, SOLUTION INTRAVENOUS ONCE
Status: COMPLETED | OUTPATIENT
Start: 2024-02-21 | End: 2024-02-22

## 2024-02-21 RX ORDER — ONDANSETRON 2 MG/ML
4 INJECTION INTRAMUSCULAR; INTRAVENOUS ONCE
Status: COMPLETED | OUTPATIENT
Start: 2024-02-21 | End: 2024-02-21

## 2024-02-21 RX ADMIN — WATER 1000 MG: 1 INJECTION INTRAMUSCULAR; INTRAVENOUS; SUBCUTANEOUS at 21:16

## 2024-02-21 RX ADMIN — SODIUM CHLORIDE: 9 INJECTION, SOLUTION INTRAVENOUS at 22:47

## 2024-02-21 RX ADMIN — ONDANSETRON 4 MG: 2 INJECTION INTRAMUSCULAR; INTRAVENOUS at 22:05

## 2024-02-21 RX ADMIN — METRONIDAZOLE 500 MG: 500 INJECTION, SOLUTION INTRAVENOUS at 21:18

## 2024-02-21 RX ADMIN — SODIUM CHLORIDE: 9 INJECTION, SOLUTION INTRAVENOUS at 21:09

## 2024-02-21 ASSESSMENT — ENCOUNTER SYMPTOMS
NAUSEA: 1
DIARRHEA: 1
COUGH: 1
ABDOMINAL PAIN: 1
VOMITING: 1

## 2024-02-22 ENCOUNTER — HOSPITAL ENCOUNTER (OUTPATIENT)
Age: 59
Setting detail: OBSERVATION
Discharge: HOME | End: 2024-02-25
Attending: INTERNAL MEDICINE | Admitting: INTERNAL MEDICINE
Payer: COMMERCIAL

## 2024-02-22 ENCOUNTER — ANESTHESIA EVENT (OUTPATIENT)
Dept: ENDOSCOPY | Age: 59
End: 2024-02-22
Payer: COMMERCIAL

## 2024-02-22 ENCOUNTER — ANESTHESIA (OUTPATIENT)
Dept: ENDOSCOPY | Age: 59
End: 2024-02-22
Payer: COMMERCIAL

## 2024-02-22 ENCOUNTER — APPOINTMENT (OUTPATIENT)
Dept: ULTRASOUND IMAGING | Age: 59
End: 2024-02-22
Attending: INTERNAL MEDICINE
Payer: COMMERCIAL

## 2024-02-22 VITALS
SYSTOLIC BLOOD PRESSURE: 127 MMHG | BODY MASS INDEX: 45.52 KG/M2 | DIASTOLIC BLOOD PRESSURE: 70 MMHG | HEIGHT: 67 IN | WEIGHT: 290 LBS | TEMPERATURE: 98.3 F | HEART RATE: 67 BPM | RESPIRATION RATE: 16 BRPM | OXYGEN SATURATION: 97 %

## 2024-02-22 PROBLEM — K92.2 GIB (GASTROINTESTINAL BLEEDING): Status: ACTIVE | Noted: 2024-02-22

## 2024-02-22 LAB
ALBUMIN SERPL-MCNC: 4.3 G/DL (ref 3.5–5.2)
ALP SERPL-CCNC: 63 U/L (ref 35–104)
ALT SERPL-CCNC: 109 U/L (ref 0–32)
ANION GAP SERPL CALCULATED.3IONS-SCNC: 22 MMOL/L (ref 7–16)
AST SERPL-CCNC: 206 U/L (ref 0–31)
BILIRUB SERPL-MCNC: 1.4 MG/DL (ref 0–1.2)
BUN SERPL-MCNC: 14 MG/DL (ref 6–20)
CALCIUM SERPL-MCNC: 9 MG/DL (ref 8.6–10.2)
CHLORIDE SERPL-SCNC: 96 MMOL/L (ref 98–107)
CO2 SERPL-SCNC: 16 MMOL/L (ref 22–29)
CREAT SERPL-MCNC: 1.2 MG/DL (ref 0.5–1)
FERRITIN SERPL-MCNC: 65 NG/ML
FOLATE SERPL-MCNC: 6.8 NG/ML (ref 4.8–24.2)
GFR SERPL CREATININE-BSD FRML MDRD: 51 ML/MIN/1.73M2
GLUCOSE SERPL-MCNC: 96 MG/DL (ref 74–99)
HCT VFR BLD AUTO: 25.7 % (ref 34–48)
HCT VFR BLD AUTO: 26.4 % (ref 34–48)
HCT VFR BLD AUTO: 27.3 % (ref 34–48)
HGB BLD-MCNC: 7.6 G/DL (ref 11.5–15.5)
HGB BLD-MCNC: 7.6 G/DL (ref 11.5–15.5)
HGB BLD-MCNC: 8.1 G/DL (ref 11.5–15.5)
INR PPP: 1.3
IRON SATN MFR SERPL: 33 % (ref 15–50)
IRON SERPL-MCNC: 130 UG/DL (ref 37–145)
LACTATE BLDV-SCNC: 2.8 MMOL/L (ref 0.5–2.2)
LACTATE BLDV-SCNC: 3.2 MMOL/L (ref 0.5–2.2)
POTASSIUM SERPL-SCNC: 3.9 MMOL/L (ref 3.5–5)
PROT SERPL-MCNC: 7.2 G/DL (ref 6.4–8.3)
PROTHROMBIN TIME: 14.6 SEC (ref 9.3–12.4)
SODIUM SERPL-SCNC: 134 MMOL/L (ref 132–146)
TIBC SERPL-MCNC: 400 UG/DL (ref 250–450)
TROPONIN I SERPL HS-MCNC: 16 NG/L (ref 0–9)
VIT B12 SERPL-MCNC: 1089 PG/ML (ref 211–946)

## 2024-02-22 PROCEDURE — 6370000000 HC RX 637 (ALT 250 FOR IP): Performed by: INTERNAL MEDICINE

## 2024-02-22 PROCEDURE — 36415 COLL VENOUS BLD VENIPUNCTURE: CPT

## 2024-02-22 PROCEDURE — 87427 SHIGA-LIKE TOXIN AG IA: CPT

## 2024-02-22 PROCEDURE — 2580000003 HC RX 258: Performed by: NURSE ANESTHETIST, CERTIFIED REGISTERED

## 2024-02-22 PROCEDURE — 87449 NOS EACH ORGANISM AG IA: CPT

## 2024-02-22 PROCEDURE — 6360000002 HC RX W HCPCS: Performed by: HOSPITALIST

## 2024-02-22 PROCEDURE — 6360000002 HC RX W HCPCS: Performed by: INTERNAL MEDICINE

## 2024-02-22 PROCEDURE — 87046 STOOL CULTR AEROBIC BACT EA: CPT

## 2024-02-22 PROCEDURE — 84484 ASSAY OF TROPONIN QUANT: CPT

## 2024-02-22 PROCEDURE — 76705 ECHO EXAM OF ABDOMEN: CPT

## 2024-02-22 PROCEDURE — 82746 ASSAY OF FOLIC ACID SERUM: CPT

## 2024-02-22 PROCEDURE — G0378 HOSPITAL OBSERVATION PER HR: HCPCS

## 2024-02-22 PROCEDURE — 87493 C DIFF AMPLIFIED PROBE: CPT

## 2024-02-22 PROCEDURE — 87045 FECES CULTURE AEROBIC BACT: CPT

## 2024-02-22 PROCEDURE — 2500000003 HC RX 250 WO HCPCS: Performed by: NURSE ANESTHETIST, CERTIFIED REGISTERED

## 2024-02-22 PROCEDURE — 85014 HEMATOCRIT: CPT

## 2024-02-22 PROCEDURE — 87328 CRYPTOSPORIDIUM AG IA: CPT

## 2024-02-22 PROCEDURE — 3609017100 HC EGD: Performed by: INTERNAL MEDICINE

## 2024-02-22 PROCEDURE — 76937 US GUIDE VASCULAR ACCESS: CPT

## 2024-02-22 PROCEDURE — 80053 COMPREHEN METABOLIC PANEL: CPT

## 2024-02-22 PROCEDURE — 6370000000 HC RX 637 (ALT 250 FOR IP): Performed by: HOSPITALIST

## 2024-02-22 PROCEDURE — 82728 ASSAY OF FERRITIN: CPT

## 2024-02-22 PROCEDURE — C9113 INJ PANTOPRAZOLE SODIUM, VIA: HCPCS | Performed by: HOSPITALIST

## 2024-02-22 PROCEDURE — 2709999900 HC NON-CHARGEABLE SUPPLY: Performed by: INTERNAL MEDICINE

## 2024-02-22 PROCEDURE — 7100000010 HC PHASE II RECOVERY - FIRST 15 MIN: Performed by: INTERNAL MEDICINE

## 2024-02-22 PROCEDURE — 83550 IRON BINDING TEST: CPT

## 2024-02-22 PROCEDURE — 82607 VITAMIN B-12: CPT

## 2024-02-22 PROCEDURE — 2580000003 HC RX 258: Performed by: INTERNAL MEDICINE

## 2024-02-22 PROCEDURE — 6360000002 HC RX W HCPCS: Performed by: NURSE ANESTHETIST, CERTIFIED REGISTERED

## 2024-02-22 PROCEDURE — 3700000000 HC ANESTHESIA ATTENDED CARE: Performed by: INTERNAL MEDICINE

## 2024-02-22 PROCEDURE — 3700000001 HC ADD 15 MINUTES (ANESTHESIA): Performed by: INTERNAL MEDICINE

## 2024-02-22 PROCEDURE — C9113 INJ PANTOPRAZOLE SODIUM, VIA: HCPCS | Performed by: INTERNAL MEDICINE

## 2024-02-22 PROCEDURE — 83605 ASSAY OF LACTIC ACID: CPT

## 2024-02-22 PROCEDURE — 83540 ASSAY OF IRON: CPT

## 2024-02-22 PROCEDURE — 83993 ASSAY FOR CALPROTECTIN FECAL: CPT

## 2024-02-22 PROCEDURE — 85018 HEMOGLOBIN: CPT

## 2024-02-22 PROCEDURE — 85610 PROTHROMBIN TIME: CPT

## 2024-02-22 PROCEDURE — 82705 FATS/LIPIDS FECES QUAL: CPT

## 2024-02-22 PROCEDURE — 82272 OCCULT BLD FECES 1-3 TESTS: CPT

## 2024-02-22 PROCEDURE — G0379 DIRECT REFER HOSPITAL OBSERV: HCPCS

## 2024-02-22 PROCEDURE — 7100000011 HC PHASE II RECOVERY - ADDTL 15 MIN: Performed by: INTERNAL MEDICINE

## 2024-02-22 PROCEDURE — 87324 CLOSTRIDIUM AG IA: CPT

## 2024-02-22 RX ORDER — PANTOPRAZOLE SODIUM 40 MG/1
40 TABLET, DELAYED RELEASE ORAL
Status: DISCONTINUED | OUTPATIENT
Start: 2024-02-23 | End: 2024-02-23

## 2024-02-22 RX ORDER — SODIUM CHLORIDE 9 MG/ML
INJECTION, SOLUTION INTRAVENOUS CONTINUOUS
Status: DISCONTINUED | OUTPATIENT
Start: 2024-02-22 | End: 2024-02-24

## 2024-02-22 RX ORDER — OSELTAMIVIR PHOSPHATE 75 MG/1
75 CAPSULE ORAL 2 TIMES DAILY
Status: DISCONTINUED | OUTPATIENT
Start: 2024-02-22 | End: 2024-02-25 | Stop reason: HOSPADM

## 2024-02-22 RX ORDER — METOPROLOL TARTRATE 50 MG/1
100 TABLET, FILM COATED ORAL 2 TIMES DAILY
Status: DISCONTINUED | OUTPATIENT
Start: 2024-02-22 | End: 2024-02-24

## 2024-02-22 RX ORDER — SODIUM CHLORIDE 0.9 % (FLUSH) 0.9 %
10 SYRINGE (ML) INJECTION EVERY 12 HOURS SCHEDULED
Status: DISCONTINUED | OUTPATIENT
Start: 2024-02-22 | End: 2024-02-25 | Stop reason: HOSPADM

## 2024-02-22 RX ORDER — AMLODIPINE BESYLATE 5 MG/1
5 TABLET ORAL DAILY
Status: DISCONTINUED | OUTPATIENT
Start: 2024-02-22 | End: 2024-02-25 | Stop reason: HOSPADM

## 2024-02-22 RX ORDER — LIDOCAINE HYDROCHLORIDE 20 MG/ML
INJECTION, SOLUTION INFILTRATION; PERINEURAL PRN
Status: DISCONTINUED | OUTPATIENT
Start: 2024-02-22 | End: 2024-02-22 | Stop reason: SDUPTHER

## 2024-02-22 RX ORDER — SODIUM CHLORIDE 9 MG/ML
INJECTION, SOLUTION INTRAVENOUS PRN
Status: DISCONTINUED | OUTPATIENT
Start: 2024-02-22 | End: 2024-02-25 | Stop reason: HOSPADM

## 2024-02-22 RX ORDER — LEVOTHYROXINE SODIUM 0.05 MG/1
50 TABLET ORAL DAILY
Status: DISCONTINUED | OUTPATIENT
Start: 2024-02-22 | End: 2024-02-25 | Stop reason: HOSPADM

## 2024-02-22 RX ORDER — ONDANSETRON 2 MG/ML
4 INJECTION INTRAMUSCULAR; INTRAVENOUS EVERY 6 HOURS PRN
Status: DISCONTINUED | OUTPATIENT
Start: 2024-02-22 | End: 2024-02-25 | Stop reason: HOSPADM

## 2024-02-22 RX ORDER — LORAZEPAM 2 MG/ML
1 INJECTION INTRAMUSCULAR EVERY 4 HOURS PRN
Status: DISCONTINUED | OUTPATIENT
Start: 2024-02-22 | End: 2024-02-25 | Stop reason: HOSPADM

## 2024-02-22 RX ORDER — PROPOFOL 10 MG/ML
INJECTION, EMULSION INTRAVENOUS PRN
Status: DISCONTINUED | OUTPATIENT
Start: 2024-02-22 | End: 2024-02-22 | Stop reason: SDUPTHER

## 2024-02-22 RX ORDER — PANTOPRAZOLE SODIUM 40 MG/10ML
40 INJECTION, POWDER, LYOPHILIZED, FOR SOLUTION INTRAVENOUS EVERY 6 HOURS
Status: DISCONTINUED | OUTPATIENT
Start: 2024-02-22 | End: 2024-02-22

## 2024-02-22 RX ORDER — SODIUM CHLORIDE, SODIUM LACTATE, POTASSIUM CHLORIDE, CALCIUM CHLORIDE 600; 310; 30; 20 MG/100ML; MG/100ML; MG/100ML; MG/100ML
INJECTION, SOLUTION INTRAVENOUS CONTINUOUS PRN
Status: DISCONTINUED | OUTPATIENT
Start: 2024-02-22 | End: 2024-02-22 | Stop reason: SDUPTHER

## 2024-02-22 RX ORDER — PROMETHAZINE HYDROCHLORIDE 25 MG/1
12.5 TABLET ORAL EVERY 6 HOURS PRN
Status: DISCONTINUED | OUTPATIENT
Start: 2024-02-22 | End: 2024-02-25 | Stop reason: HOSPADM

## 2024-02-22 RX ORDER — SODIUM CHLORIDE 0.9 % (FLUSH) 0.9 %
10 SYRINGE (ML) INJECTION PRN
Status: DISCONTINUED | OUTPATIENT
Start: 2024-02-22 | End: 2024-02-25 | Stop reason: HOSPADM

## 2024-02-22 RX ORDER — POLYETHYLENE GLYCOL 3350 17 G/17G
17 POWDER, FOR SOLUTION ORAL DAILY PRN
Status: DISCONTINUED | OUTPATIENT
Start: 2024-02-22 | End: 2024-02-25 | Stop reason: HOSPADM

## 2024-02-22 RX ORDER — ACETAMINOPHEN 325 MG/1
650 TABLET ORAL EVERY 6 HOURS PRN
Status: DISCONTINUED | OUTPATIENT
Start: 2024-02-22 | End: 2024-02-25 | Stop reason: HOSPADM

## 2024-02-22 RX ORDER — PANTOPRAZOLE SODIUM 40 MG/10ML
40 INJECTION, POWDER, LYOPHILIZED, FOR SOLUTION INTRAVENOUS 2 TIMES DAILY
Status: DISCONTINUED | OUTPATIENT
Start: 2024-02-22 | End: 2024-02-22

## 2024-02-22 RX ORDER — ACETAMINOPHEN 650 MG/1
650 SUPPOSITORY RECTAL EVERY 6 HOURS PRN
Status: DISCONTINUED | OUTPATIENT
Start: 2024-02-22 | End: 2024-02-25 | Stop reason: HOSPADM

## 2024-02-22 RX ADMIN — SODIUM CHLORIDE: 9 INJECTION, SOLUTION INTRAVENOUS at 20:00

## 2024-02-22 RX ADMIN — OSELTAMIVIR PHOSPHATE 75 MG: 75 CAPSULE ORAL at 12:51

## 2024-02-22 RX ADMIN — OSELTAMIVIR PHOSPHATE 75 MG: 75 CAPSULE ORAL at 19:56

## 2024-02-22 RX ADMIN — SODIUM CHLORIDE, PRESERVATIVE FREE 10 ML: 5 INJECTION INTRAVENOUS at 08:50

## 2024-02-22 RX ADMIN — LIDOCAINE HYDROCHLORIDE 50 MG: 20 INJECTION, SOLUTION INFILTRATION; PERINEURAL at 17:30

## 2024-02-22 RX ADMIN — PROPOFOL 30 MG: 10 INJECTION, EMULSION INTRAVENOUS at 17:31

## 2024-02-22 RX ADMIN — AMLODIPINE BESYLATE 5 MG: 5 TABLET ORAL at 19:55

## 2024-02-22 RX ADMIN — POLYETHYLENE GLYCOL-3350 AND ELECTROLYTES 4000 ML: 236; 6.74; 5.86; 2.97; 22.74 POWDER, FOR SOLUTION ORAL at 18:41

## 2024-02-22 RX ADMIN — PROPOFOL 100 MG: 10 INJECTION, EMULSION INTRAVENOUS at 17:30

## 2024-02-22 RX ADMIN — LEVOTHYROXINE SODIUM 50 MCG: 0.05 TABLET ORAL at 05:28

## 2024-02-22 RX ADMIN — METOPROLOL TARTRATE 100 MG: 50 TABLET, FILM COATED ORAL at 19:55

## 2024-02-22 RX ADMIN — PROPOFOL 20 MG: 10 INJECTION, EMULSION INTRAVENOUS at 17:32

## 2024-02-22 RX ADMIN — PANTOPRAZOLE SODIUM 40 MG: 40 INJECTION, POWDER, FOR SOLUTION INTRAVENOUS at 16:18

## 2024-02-22 RX ADMIN — METOPROLOL TARTRATE 100 MG: 50 TABLET, FILM COATED ORAL at 08:47

## 2024-02-22 RX ADMIN — ONDANSETRON 4 MG: 2 INJECTION INTRAMUSCULAR; INTRAVENOUS at 05:28

## 2024-02-22 RX ADMIN — SODIUM CHLORIDE, POTASSIUM CHLORIDE, SODIUM LACTATE AND CALCIUM CHLORIDE: 600; 310; 30; 20 INJECTION, SOLUTION INTRAVENOUS at 17:28

## 2024-02-22 RX ADMIN — PROPOFOL 20 MG: 10 INJECTION, EMULSION INTRAVENOUS at 17:33

## 2024-02-22 RX ADMIN — PANTOPRAZOLE SODIUM 40 MG: 40 INJECTION, POWDER, FOR SOLUTION INTRAVENOUS at 08:49

## 2024-02-22 RX ADMIN — SODIUM CHLORIDE: 9 INJECTION, SOLUTION INTRAVENOUS at 05:27

## 2024-02-22 RX ADMIN — PROPOFOL 20 MG: 10 INJECTION, EMULSION INTRAVENOUS at 17:34

## 2024-02-22 NOTE — OP NOTE
Operative Note      Patient: Kyleigh Brantley  YOB: 1965  MRN: [id number]    Date of Procedure: 2/22/2024    Pre-Op Diagnosis Codes:     * Anemia, unspecified type [D64.9]    Post-Op Diagnosis: Same       Procedure(s):  EGD ESOPHAGOGASTRODUODENOSCOPY    Surgeon(s):  Rafal Cobb MD    Assistant:   Surgical Assistant: Janis Haney RN    Anesthesia: Monitor Anesthesia Care    Estimated Blood Loss (mL): none    Complications: None    Specimens:   * No specimens in log *    Implants:  * No implants in log *      Drains: * No LDAs found *    Findings: see below        Detailed Description of Procedure: EGD   Indication melena, anemia    Sedation  MAC    Endoscope was advanced easily through mouth to second portion of duodenum      Oropharynx views are limited but grossly normal.    Esophagus:   Mucosa is normal.  GEJ at 35 cm.  Small hiatal hernia.  No varices, no blood    Stomach:   Antrum is normal    Gastric body is normal.    Retroflexed views show normal fundus and cardia. No varices    Duodenum: Bulb is normal.    Second portion of duodenum is normal.    IMPRESSION AND PLAN: small hiatal hernia otherwise normal upper endoscopy.  Follow up as outpatient in office, call  [phone] to schedule for appointment.      Electronically signed by Rafal Cobb MD on 2/22/2024 at 5:28 PM

## 2024-02-22 NOTE — ED PROVIDER NOTES
[facility] EMERGENCY DEPARTMENT  EMERGENCY DEPARTMENT ENCOUNTER        Pt Name: Kyleigh Brantley  MRN: [id number]  Birthdate 1965  Date of evaluation: 2/21/2024  Provider: Cecilio Denise DO  PCP: Jose Nguyen DO  Note Started: 8:41 PM EST 2/21/24    CHIEF COMPLAINT       Chief Complaint   Patient presents with    Nausea    Emesis    Diarrhea    Abdominal Pain     RUQ, radiates across upper abd, dizziness, dehydration        HISTORY OF PRESENT ILLNESS: 1 or more Elements     History from : Patient    Limitations to history : None    Kyleigh Brantley is a 59 y.o. female who presents for diarrhea vomiting, hematochezia since Saturday, pt notes no fevers, pt notes diffuse abdominal pain. She notes intermittent lightheaded. No sick contacts, she notes she does have a cough. Pts gi doctor is dr erickson.    Nursing Notes were all reviewed and agreed with or any disagreements were addressed in the HPI.    REVIEW OF SYSTEMS :      Review of Systems   Constitutional:  Negative for diaphoresis and fatigue.   Respiratory:  Positive for cough.    Gastrointestinal:  Positive for abdominal pain, diarrhea, nausea and vomiting.       Positives and Pertinent negatives as per HPI.     SURGICAL HISTORY     Past Surgical History:   Procedure Laterality Date    CHOLECYSTECTOMY      COLONOSCOPY      CYST REMOVAL      from hand    ENDOSCOPY, COLON, DIAGNOSTIC      TONSILLECTOMY      TUBAL LIGATION         CURRENTMEDICATIONS       Discharge Medication List as of 2/22/2024  2:41 AM        CONTINUE these medications which have NOT CHANGED    Details   ondansetron (ZOFRAN) 4 MG tablet Take 1 tablet by mouth 3 times daily as needed for Nausea or Vomiting, Disp-15 tablet, R-0Normal      silver sulfADIAZINE (SILVADENE) 1 % cream Apply topically 3 times daily., Disp-50 g, R-3, Normal      magnesium 30 MG tablet Take 30 mg by mouth 2 times dailyHistorical Med      b complex vitamins capsule Take

## 2024-02-22 NOTE — CONSULTS
CONSULT  Ricco Kee M.D.  [facility]  Dr. Neida Mares M.D.,  Dr. Rafal Cobb M.D.,  Dr. Jose Wheeler D.O.,  Dr. Preston Duron D.O. ,  Dr. Kurtis Trinh M.D.,          Kyleigh CALDERON Brantley  59 y.o.  female      Re: \"GIB/hematochezia\"  Requesting physician: Dr. Bradshaw   Date:10:40 AM 2/22/2024          HPI: 59-year-old female patient seen in the hospital for above described issue.  Patient presented to the emergency department yesterday because of diarrhea with associated nausea.  Patient reports blood with bowel movements.  She reports noticing blood with bowel movements both with the stool and on the toilet paper for approximately a year now.  Patient reports that she had colonoscopy approximately 2 years ago and from her description appears to have been done in our office in [place] however she cannot provide details.  She does not recall upper endoscopy.  Patient is noted to be anemic on presentation with further decrease in H&H from 8.7-7.6.  Platelet count is also decreased at 101 with a white blood cell count of 3.9.  INR is 1.3.  Chemistry panel reveals renal failure which is improving since yesterday.  BUN is 14 with creatinine of 1.2.  Liver profile shows elevated transaminases and bilirubin with nonelevated alkaline phosphatase.  Patient CT scan of the abdomen and pelvis was reported to show no acute intra-abdominal or pelvic abnormality with also hepatic steatosis.  Patient herself complains of abdominal pain which she localizes in the epigastrium.  She reports being nauseated but no actual emesis including no hematemesis emesis of coffee-ground material.  She is unsure of any black or tarry stool.  Patient denies any significant use of NSAIDs, antiplatelet medications or oral anticoagulation.    Information sources:   -Patient  -medical record  -health care team    PMHx:  Past Medical History:   Diagnosis Date    Cancer (HCC)     L breast     Degenerative arthritis     of lower

## 2024-02-22 NOTE — ANESTHESIA POSTPROCEDURE EVALUATION
Department of Anesthesiology  Postprocedure Note    Patient: Kyleigh Brantley  MRN: [id number]  YOB: 1965  Date of evaluation: 2/22/2024    Procedure Summary       Date: 02/22/24 Room / Location: [place] [address] / [facility]    Anesthesia Start: 1728 Anesthesia Stop: 1743    Procedure: EGD ESOPHAGOGASTRODUODENOSCOPY Diagnosis:       Anemia, unspecified type      (Anemia, unspecified type [D64.9])    Surgeons: Rafal Cobb MD Responsible Provider: James Mcgee MD    Anesthesia Type: MAC ASA Status: 3            Anesthesia Type: MAC    Janet Phase I:      Janet Phase II: Janet Score: 10    Anesthesia Post Evaluation    Patient location during evaluation: bedside  Patient participation: complete - patient participated  Level of consciousness: awake  Pain score: 0  Airway patency: patent  Nausea & Vomiting: no nausea and no vomiting  Cardiovascular status: hemodynamically stable  Respiratory status: acceptable  Hydration status: euvolemic  Pain management: adequate        No notable events documented.

## 2024-02-22 NOTE — ANESTHESIA PRE PROCEDURE
Topics    Alcohol use: Yes     Alcohol/week: 5.0 standard drinks of alcohol     Types: 5 Shots of liquor per week                                Counseling given: Not Answered      Vital Signs (Current):   Vitals:    02/22/24 0330 02/22/24 0331 02/22/24 0758 02/22/24 0847   BP: (!) 132/90  (!) 153/74 (!) 153/74   Pulse: 75  78 78   Resp: 18  20    Temp: 36.9 °C (98.4 °F)  36.7 °C (98.1 °F)    TempSrc: Oral  Oral    SpO2: 100%  100%    Weight:  128.5 kg (283 lb 3.2 oz)     Height:  1.702 m (5' 7\")                                                BP Readings from Last 3 Encounters:   02/22/24 (!) 153/74   02/22/24 127/70   04/15/22 129/77       NPO Status: Time of last liquid consumption: 0000                        Time of last solid consumption: 0500                        Date of last liquid consumption: 02/21/24                        Date of last solid food consumption: 02/22/24    BMI:   Wt Readings from Last 3 Encounters:   02/22/24 128.5 kg (283 lb 3.2 oz)   02/21/24 131.5 kg (290 lb)   04/15/22 131.9 kg (290 lb 12.8 oz)     Body mass index is 44.36 kg/m².    CBC:   Lab Results   Component Value Date/Time    WBC 3.9 02/21/2024 07:00 PM    RBC 4.04 02/21/2024 07:00 PM    HGB 7.6 02/22/2024 01:39 PM    HCT 25.7 02/22/2024 01:39 PM    MCV 70.8 02/21/2024 07:00 PM    RDW 21.7 02/21/2024 07:00 PM     02/21/2024 07:00 PM       CMP:   Lab Results   Component Value Date/Time     02/22/2024 09:20 AM    K 3.9 02/22/2024 09:20 AM    K 3.9 12/13/2020 08:19 PM    CL 96 02/22/2024 09:20 AM    CO2 16 02/22/2024 09:20 AM    BUN 14 02/22/2024 09:20 AM    CREATININE 1.2 02/22/2024 09:20 AM    GFRAA >60 12/13/2020 08:19 PM    LABGLOM 51 02/22/2024 09:20 AM    GLUCOSE 96 02/22/2024 09:20 AM    PROT 7.2 02/22/2024 09:20 AM    CALCIUM 9.0 02/22/2024 09:20 AM    BILITOT 1.4 02/22/2024 09:20 AM    ALKPHOS 63 02/22/2024 09:20 AM     02/22/2024 09:20 AM     02/22/2024 09:20 AM       POC Tests:   Recent Labs

## 2024-02-22 NOTE — H&P
Admission medication history interview status for this patient is complete. See [facility] admission navigator for allergy information, prior to admission medications and immunization status.     Medication history interview done, indicate source(s): Patient  Medication history resources (including written lists, pill bottles, clinic record): SureScript and Care Everywhere  Pharmacy: Unknown for now    Changes made to PTA medication list:  Added: Chlorthalidone, Losartan, Meloxicam, Quetiapine (2 different strengths)  Changed: None  Reported as Not Taking: None  Removed: Clonidine, Medrol dosepack, oxycodone, prednisone    Actions taken by pharmacist (provider contacted, etc):None     Additional medication history information: None    Medication reconciliation/reorder completed by provider prior to medication history?  No    Prior to Admission medications    Medication Sig Last Dose Taking? Auth Provider Long Term End Date   chlorthalidone (HYGROTON) 25 MG tablet Take 25 mg by mouth daily 7/1/2022 at Unknown time Yes Unknown, Entered By History Yes    ClonazePAM (KLONOPIN PO) Take 1 mg by mouth daily 7/1/2022 at Unknown time Yes Reported, Patient     losartan (COZAAR) 100 MG tablet Take 100 mg by mouth daily 7/1/2022 at Unknown time Yes Unknown, Entered By History Yes    meloxicam (MOBIC) 15 MG tablet Take 15 mg by mouth daily as needed for pain  at PRN Yes Unknown, Entered By History Yes    QUEtiapine (SEROQUEL) 100 MG tablet Take 100 mg by mouth At Bedtime 7/1/2022 at Unknown time Yes Unknown, Entered By History Yes    QUEtiapine (SEROQUEL) 25 MG tablet Take 25 mg by mouth 2 times daily as needed (agitation) Past Week at Unknown time Yes Unknown, Entered By History Yes    Sertraline HCl (ZOLOFT PO) Take 100 mg by mouth daily Past Week at Unknown time Yes Reported, Patient     traZODone (DESYREL) 100 MG tablet Take 100-200 mg by mouth nightly as needed for sleep 7/1/2022 at Unknown time Yes Unknown, Entered By History    20   Ht 1.702 m (5' 7\")   Wt 128.5 kg (283 lb 3.2 oz)   LMP 02/14/2013 (Approximate)   SpO2 100%   BMI 44.36 kg/m²     General:  This is a 59 y.o. yo female who is alert and oriented in mild distress secondary to above  HEENT:  Head is normocephalic and atraumatic, PERRLA, EOMI, mucus membranes moist with no pharyngeal erythema or exudate.  Neck:  Supple with no carotid bruits, JVD or thyromegaly.  No cervical adenopathy  CV:  Regular rate and rhythm, no murmurs  Lungs:  Clear to auscultation bilaterally with no wheezes, rales or rhonchi  Abdomen:  Soft,+ epigastric discomfort, nontender,+ abdominal fat nondistended, bowel sounds present  Extremities:  No edema, peripheral pulses intact bilaterally  Neuro:  Cranial nerves II-XII grossly intact; motor and sensory function intact with no focal deficits  Skin:  No rashes, lesions or wounds    DATA:  CBC with Differential:    Lab Results   Component Value Date/Time    WBC 3.9 02/21/2024 07:00 PM    RBC 4.04 02/21/2024 07:00 PM    HGB 7.6 02/22/2024 09:20 AM    HCT 26.4 02/22/2024 09:20 AM     02/21/2024 07:00 PM    MCV 70.8 02/21/2024 07:00 PM    MCH 21.5 02/21/2024 07:00 PM    MCHC 30.4 02/21/2024 07:00 PM    RDW 21.7 02/21/2024 07:00 PM    LYMPHOPCT 29 02/21/2024 07:00 PM    MONOPCT 17 02/21/2024 07:00 PM    BASOPCT 2 02/21/2024 07:00 PM    MONOSABS 0.66 02/21/2024 07:00 PM    LYMPHSABS 1.12 02/21/2024 07:00 PM    EOSABS 0.04 02/21/2024 07:00 PM    BASOSABS 0.06 02/21/2024 07:00 PM     CMP:    Lab Results   Component Value Date/Time     02/22/2024 09:20 AM    K 3.9 02/22/2024 09:20 AM    K 3.9 12/13/2020 08:19 PM    CL 96 02/22/2024 09:20 AM    CO2 16 02/22/2024 09:20 AM    BUN 14 02/22/2024 09:20 AM    CREATININE 1.2 02/22/2024 09:20 AM    GFRAA >60 12/13/2020 08:19 PM    LABGLOM 51 02/22/2024 09:20 AM    GLUCOSE 96 02/22/2024 09:20 AM    PROT 7.2 02/22/2024 09:20 AM    LABALBU 4.3 02/22/2024 09:20 AM    CALCIUM 9.0 02/22/2024 09:20 AM    BILITOT

## 2024-02-22 NOTE — ACP (ADVANCE CARE PLANNING)
Advance Care Planning   Healthcare Decision Maker:    Primary Decision Maker: Robert Hankins - Child - [phone]    Secondary Decision Maker: Jackie Garcia - Brother/Sister - [phone]    .  Today we documented Decision Maker(s) consistent with Legal Next of Kin hierarchy.

## 2024-02-23 ENCOUNTER — ANESTHESIA (OUTPATIENT)
Dept: ENDOSCOPY | Age: 59
End: 2024-02-23
Payer: COMMERCIAL

## 2024-02-23 ENCOUNTER — ANESTHESIA EVENT (OUTPATIENT)
Dept: ENDOSCOPY | Age: 59
End: 2024-02-23
Payer: COMMERCIAL

## 2024-02-23 LAB
ALBUMIN SERPL-MCNC: 4.2 G/DL (ref 3.5–5.2)
ALP SERPL-CCNC: 59 U/L (ref 35–104)
ALT SERPL-CCNC: 136 U/L (ref 0–32)
ANION GAP SERPL CALCULATED.3IONS-SCNC: 23 MMOL/L (ref 7–16)
AST SERPL-CCNC: 251 U/L (ref 0–31)
BASOPHILS # BLD: 0.07 K/UL (ref 0–0.2)
BASOPHILS NFR BLD: 2 % (ref 0–2)
BILIRUB SERPL-MCNC: 0.9 MG/DL (ref 0–1.2)
BUN SERPL-MCNC: 11 MG/DL (ref 6–20)
C DIFF GDH + TOXINS A+B STL QL IA.RAPID: ABNORMAL
C DIFFICILE TOXINS, PCR: POSITIVE
C PARVUM AG STL QL IA: NEGATIVE
CALCIUM SERPL-MCNC: 8.7 MG/DL (ref 8.6–10.2)
CHLORIDE SERPL-SCNC: 98 MMOL/L (ref 98–107)
CO2 SERPL-SCNC: 16 MMOL/L (ref 22–29)
CREAT SERPL-MCNC: 1.1 MG/DL (ref 0.5–1)
DATE, STOOL #1: NORMAL
EKG ATRIAL RATE: 73 BPM
EKG P AXIS: 25 DEGREES
EKG P-R INTERVAL: 170 MS
EKG Q-T INTERVAL: 412 MS
EKG QRS DURATION: 90 MS
EKG QTC CALCULATION (BAZETT): 453 MS
EKG R AXIS: 5 DEGREES
EKG T AXIS: 12 DEGREES
EKG VENTRICULAR RATE: 73 BPM
EOSINOPHIL # BLD: 0.15 K/UL (ref 0.05–0.5)
EOSINOPHILS RELATIVE PERCENT: 3 % (ref 0–6)
ERYTHROCYTE [DISTWIDTH] IN BLOOD BY AUTOMATED COUNT: 22 % (ref 11.5–15)
GFR SERPL CREATININE-BSD FRML MDRD: 60 ML/MIN/1.73M2
GLUCOSE SERPL-MCNC: 98 MG/DL (ref 74–99)
HCT VFR BLD AUTO: 24.8 % (ref 34–48)
HCT VFR BLD AUTO: 24.9 % (ref 34–48)
HCT VFR BLD AUTO: 26.3 % (ref 34–48)
HCT VFR BLD AUTO: 27.5 % (ref 34–48)
HEMOCCULT SP1 STL QL: NEGATIVE
HGB BLD-MCNC: 7.3 G/DL (ref 11.5–15.5)
HGB BLD-MCNC: 7.5 G/DL (ref 11.5–15.5)
HGB BLD-MCNC: 7.7 G/DL (ref 11.5–15.5)
HGB BLD-MCNC: 8.1 G/DL (ref 11.5–15.5)
LYMPHOCYTES NFR BLD: 1.48 K/UL (ref 1.5–4)
LYMPHOCYTES RELATIVE PERCENT: 32 % (ref 20–42)
MCH RBC QN AUTO: 22.1 PG (ref 26–35)
MCHC RBC AUTO-ENTMCNC: 30.1 G/DL (ref 32–34.5)
MCV RBC AUTO: 73.5 FL (ref 80–99.9)
MONOCYTES NFR BLD: 0.74 K/UL (ref 0.1–0.95)
MONOCYTES NFR BLD: 16 % (ref 2–12)
NEUTROPHILS NFR BLD: 46 % (ref 43–80)
NEUTS SEG NFR BLD: 2.09 K/UL (ref 1.8–7.3)
PLATELET CONFIRMATION: NORMAL
PLATELET, FLUORESCENCE: 93 K/UL (ref 130–450)
PMV BLD AUTO: ABNORMAL FL (ref 7–12)
POTASSIUM SERPL-SCNC: 3.8 MMOL/L (ref 3.5–5)
PROT SERPL-MCNC: 7 G/DL (ref 6.4–8.3)
RBC # BLD AUTO: 3.39 M/UL (ref 3.5–5.5)
RBC # BLD: ABNORMAL 10*6/UL
SODIUM SERPL-SCNC: 137 MMOL/L (ref 132–146)
SOURCE, 60200063: NORMAL
SPECIMEN DESCRIPTION: ABNORMAL
SPECIMEN DESCRIPTION: ABNORMAL
SPECIMEN DESCRIPTION: NORMAL
TIME, STOOL #1: 2017
WBC OTHER # BLD: 4.6 K/UL (ref 4.5–11.5)

## 2024-02-23 PROCEDURE — 6370000000 HC RX 637 (ALT 250 FOR IP): Performed by: INTERNAL MEDICINE

## 2024-02-23 PROCEDURE — 85018 HEMOGLOBIN: CPT

## 2024-02-23 PROCEDURE — 2580000003 HC RX 258: Performed by: INTERNAL MEDICINE

## 2024-02-23 PROCEDURE — 93010 ELECTROCARDIOGRAM REPORT: CPT | Performed by: INTERNAL MEDICINE

## 2024-02-23 PROCEDURE — 36415 COLL VENOUS BLD VENIPUNCTURE: CPT

## 2024-02-23 PROCEDURE — G0378 HOSPITAL OBSERVATION PER HR: HCPCS

## 2024-02-23 PROCEDURE — 6360000002 HC RX W HCPCS: Performed by: INTERNAL MEDICINE

## 2024-02-23 PROCEDURE — 85014 HEMATOCRIT: CPT

## 2024-02-23 PROCEDURE — 85025 COMPLETE CBC W/AUTO DIFF WBC: CPT

## 2024-02-23 PROCEDURE — 80053 COMPREHEN METABOLIC PANEL: CPT

## 2024-02-23 RX ORDER — FAMOTIDINE 20 MG/1
20 TABLET, FILM COATED ORAL 2 TIMES DAILY
Status: DISCONTINUED | OUTPATIENT
Start: 2024-02-23 | End: 2024-02-25 | Stop reason: HOSPADM

## 2024-02-23 RX ADMIN — LEVOTHYROXINE SODIUM 50 MCG: 0.05 TABLET ORAL at 05:06

## 2024-02-23 RX ADMIN — FAMOTIDINE 20 MG: 20 TABLET, FILM COATED ORAL at 20:38

## 2024-02-23 RX ADMIN — AMLODIPINE BESYLATE 5 MG: 5 TABLET ORAL at 20:38

## 2024-02-23 RX ADMIN — OSELTAMIVIR PHOSPHATE 75 MG: 75 CAPSULE ORAL at 08:53

## 2024-02-23 RX ADMIN — METOPROLOL TARTRATE 100 MG: 50 TABLET, FILM COATED ORAL at 08:52

## 2024-02-23 RX ADMIN — METOPROLOL TARTRATE 100 MG: 50 TABLET, FILM COATED ORAL at 20:38

## 2024-02-23 RX ADMIN — OSELTAMIVIR PHOSPHATE 75 MG: 75 CAPSULE ORAL at 20:39

## 2024-02-23 RX ADMIN — VANCOMYCIN HYDROCHLORIDE 250 MG: 10 INJECTION, POWDER, LYOPHILIZED, FOR SOLUTION INTRAVENOUS at 20:41

## 2024-02-23 RX ADMIN — PANTOPRAZOLE SODIUM 40 MG: 40 TABLET, DELAYED RELEASE ORAL at 05:06

## 2024-02-23 RX ADMIN — SODIUM CHLORIDE: 9 INJECTION, SOLUTION INTRAVENOUS at 20:47

## 2024-02-23 NOTE — PLAN OF CARE
Problem: Safety - Adult  Goal: Free from fall injury  2/23/2024 1112 by Cal Garrett RN  Outcome: Progressing  2/22/2024 2251 by Jordyn Mart RN  Outcome: Progressing     Problem: ABCDS Injury Assessment  Goal: Absence of physical injury  2/23/2024 1112 by Cal Garrett RN  Outcome: Progressing  2/22/2024 2251 by Jordyn Mart RN  Outcome: Progressing

## 2024-02-23 NOTE — PLAN OF CARE
Problem: Safety - Adult  Goal: Free from fall injury  2/22/2024 2251 by Jordyn Mart RN  Outcome: Progressing     Problem: ABCDS Injury Assessment  Goal: Absence of physical injury  2/22/2024 2251 by Jordyn Mart, RN  Outcome: Progressing

## 2024-02-24 LAB
ALBUMIN SERPL-MCNC: 4.1 G/DL (ref 3.5–5.2)
ALP SERPL-CCNC: 55 U/L (ref 35–104)
ALT SERPL-CCNC: 135 U/L (ref 0–32)
AMMONIA PLAS-SCNC: 28 UMOL/L (ref 11–51)
ANION GAP SERPL CALCULATED.3IONS-SCNC: 18 MMOL/L (ref 7–16)
AST SERPL-CCNC: 191 U/L (ref 0–31)
BASOPHILS # BLD: 0.05 K/UL (ref 0–0.2)
BASOPHILS NFR BLD: 1 % (ref 0–2)
BILIRUB DIRECT SERPL-MCNC: 0.2 MG/DL (ref 0–0.3)
BILIRUB SERPL-MCNC: 0.7 MG/DL (ref 0–1.2)
BUN SERPL-MCNC: 7 MG/DL (ref 6–20)
CALCIUM SERPL-MCNC: 8.6 MG/DL (ref 8.6–10.2)
CHLORIDE SERPL-SCNC: 100 MMOL/L (ref 98–107)
CO2 SERPL-SCNC: 19 MMOL/L (ref 22–29)
CREAT SERPL-MCNC: 0.8 MG/DL (ref 0.5–1)
EOSINOPHIL # BLD: 0.15 K/UL (ref 0.05–0.5)
EOSINOPHILS RELATIVE PERCENT: 3 % (ref 0–6)
ERYTHROCYTE [DISTWIDTH] IN BLOOD BY AUTOMATED COUNT: 22.5 % (ref 11.5–15)
GFR SERPL CREATININE-BSD FRML MDRD: >60 ML/MIN/1.73M2
GLUCOSE SERPL-MCNC: 127 MG/DL (ref 74–99)
HCT VFR BLD AUTO: 24.2 % (ref 34–48)
HCT VFR BLD AUTO: 25.1 % (ref 34–48)
HCT VFR BLD AUTO: 25.3 % (ref 34–48)
HGB BLD-MCNC: 7.3 G/DL (ref 11.5–15.5)
HGB BLD-MCNC: 7.4 G/DL (ref 11.5–15.5)
HGB BLD-MCNC: 7.5 G/DL (ref 11.5–15.5)
INR PPP: 1.2
LYMPHOCYTES NFR BLD: 2.01 K/UL (ref 1.5–4)
LYMPHOCYTES RELATIVE PERCENT: 41 % (ref 20–42)
MCH RBC QN AUTO: 21.9 PG (ref 26–35)
MCHC RBC AUTO-ENTMCNC: 30.2 G/DL (ref 32–34.5)
MCV RBC AUTO: 72.7 FL (ref 80–99.9)
MONOCYTES NFR BLD: 0.49 K/UL (ref 0.1–0.95)
MONOCYTES NFR BLD: 10 % (ref 2–12)
NEUTROPHILS NFR BLD: 45 % (ref 43–80)
NEUTS SEG NFR BLD: 2.21 K/UL (ref 1.8–7.3)
NUCLEATED RED BLOOD CELLS: 2 PER 100 WBC
PLATELET # BLD AUTO: 98 K/UL (ref 130–450)
PLATELET CONFIRMATION: NORMAL
PLATELET, FLUORESCENCE: 98 K/UL (ref 130–450)
POTASSIUM SERPL-SCNC: 3.6 MMOL/L (ref 3.5–5)
PROT SERPL-MCNC: 7 G/DL (ref 6.4–8.3)
PROTHROMBIN TIME: 13.8 SEC (ref 9.3–12.4)
RBC # BLD AUTO: 3.33 M/UL (ref 3.5–5.5)
RBC # BLD: ABNORMAL 10*6/UL
SODIUM SERPL-SCNC: 137 MMOL/L (ref 132–146)
WBC OTHER # BLD: 4.9 K/UL (ref 4.5–11.5)

## 2024-02-24 PROCEDURE — 6370000000 HC RX 637 (ALT 250 FOR IP): Performed by: INTERNAL MEDICINE

## 2024-02-24 PROCEDURE — 80053 COMPREHEN METABOLIC PANEL: CPT

## 2024-02-24 PROCEDURE — 80074 ACUTE HEPATITIS PANEL: CPT

## 2024-02-24 PROCEDURE — 85018 HEMOGLOBIN: CPT

## 2024-02-24 PROCEDURE — 36415 COLL VENOUS BLD VENIPUNCTURE: CPT

## 2024-02-24 PROCEDURE — 85610 PROTHROMBIN TIME: CPT

## 2024-02-24 PROCEDURE — 6360000002 HC RX W HCPCS: Performed by: INTERNAL MEDICINE

## 2024-02-24 PROCEDURE — G0378 HOSPITAL OBSERVATION PER HR: HCPCS

## 2024-02-24 PROCEDURE — 85014 HEMATOCRIT: CPT

## 2024-02-24 PROCEDURE — 82248 BILIRUBIN DIRECT: CPT

## 2024-02-24 PROCEDURE — 82140 ASSAY OF AMMONIA: CPT

## 2024-02-24 PROCEDURE — 85025 COMPLETE CBC W/AUTO DIFF WBC: CPT

## 2024-02-24 RX ORDER — METOPROLOL TARTRATE 50 MG/1
50 TABLET, FILM COATED ORAL 2 TIMES DAILY
Status: DISCONTINUED | OUTPATIENT
Start: 2024-02-24 | End: 2024-02-25 | Stop reason: HOSPADM

## 2024-02-24 RX ORDER — SODIUM CHLORIDE AND POTASSIUM CHLORIDE 150; 900 MG/100ML; MG/100ML
INJECTION, SOLUTION INTRAVENOUS CONTINUOUS
Status: DISCONTINUED | OUTPATIENT
Start: 2024-02-24 | End: 2024-02-25 | Stop reason: HOSPADM

## 2024-02-24 RX ADMIN — LEVOTHYROXINE SODIUM 50 MCG: 0.05 TABLET ORAL at 05:51

## 2024-02-24 RX ADMIN — VANCOMYCIN HYDROCHLORIDE 250 MG: 10 INJECTION, POWDER, LYOPHILIZED, FOR SOLUTION INTRAVENOUS at 00:20

## 2024-02-24 RX ADMIN — VANCOMYCIN HYDROCHLORIDE 250 MG: 10 INJECTION, POWDER, LYOPHILIZED, FOR SOLUTION INTRAVENOUS at 11:33

## 2024-02-24 RX ADMIN — METOPROLOL TARTRATE 100 MG: 50 TABLET, FILM COATED ORAL at 08:04

## 2024-02-24 RX ADMIN — VANCOMYCIN HYDROCHLORIDE 250 MG: 10 INJECTION, POWDER, LYOPHILIZED, FOR SOLUTION INTRAVENOUS at 05:51

## 2024-02-24 RX ADMIN — OSELTAMIVIR PHOSPHATE 75 MG: 75 CAPSULE ORAL at 21:41

## 2024-02-24 RX ADMIN — POTASSIUM CHLORIDE AND SODIUM CHLORIDE: 900; 150 INJECTION, SOLUTION INTRAVENOUS at 14:08

## 2024-02-24 RX ADMIN — METOPROLOL TARTRATE 50 MG: 50 TABLET, FILM COATED ORAL at 21:41

## 2024-02-24 RX ADMIN — AMLODIPINE BESYLATE 5 MG: 5 TABLET ORAL at 21:41

## 2024-02-24 RX ADMIN — VANCOMYCIN HYDROCHLORIDE 250 MG: 10 INJECTION, POWDER, LYOPHILIZED, FOR SOLUTION INTRAVENOUS at 18:21

## 2024-02-24 RX ADMIN — OSELTAMIVIR PHOSPHATE 75 MG: 75 CAPSULE ORAL at 08:04

## 2024-02-24 RX ADMIN — FAMOTIDINE 20 MG: 20 TABLET, FILM COATED ORAL at 21:41

## 2024-02-24 RX ADMIN — FAMOTIDINE 20 MG: 20 TABLET, FILM COATED ORAL at 08:03

## 2024-02-25 VITALS
WEIGHT: 289.8 LBS | OXYGEN SATURATION: 97 % | TEMPERATURE: 98.6 F | SYSTOLIC BLOOD PRESSURE: 125 MMHG | DIASTOLIC BLOOD PRESSURE: 77 MMHG | BODY MASS INDEX: 45.48 KG/M2 | HEART RATE: 70 BPM | RESPIRATION RATE: 16 BRPM | HEIGHT: 67 IN

## 2024-02-25 LAB
ALBUMIN SERPL-MCNC: 4.2 G/DL (ref 3.5–5.2)
ALP SERPL-CCNC: 50 U/L (ref 35–104)
ALT SERPL-CCNC: 134 U/L (ref 0–32)
ANION GAP SERPL CALCULATED.3IONS-SCNC: 16 MMOL/L (ref 7–16)
AST SERPL-CCNC: 165 U/L (ref 0–31)
BASOPHILS # BLD: 0 K/UL (ref 0–0.2)
BASOPHILS NFR BLD: 0 % (ref 0–2)
BILIRUB SERPL-MCNC: 0.6 MG/DL (ref 0–1.2)
BUN SERPL-MCNC: 4 MG/DL (ref 6–20)
CALCIUM SERPL-MCNC: 8.8 MG/DL (ref 8.6–10.2)
CHLORIDE SERPL-SCNC: 102 MMOL/L (ref 98–107)
CO2 SERPL-SCNC: 19 MMOL/L (ref 22–29)
CREAT SERPL-MCNC: 0.8 MG/DL (ref 0.5–1)
EOSINOPHIL # BLD: 0.08 K/UL (ref 0.05–0.5)
EOSINOPHILS RELATIVE PERCENT: 2 % (ref 0–6)
ERYTHROCYTE [DISTWIDTH] IN BLOOD BY AUTOMATED COUNT: 22.5 % (ref 11.5–15)
FAT QUALITATIVE SPLIT STOOL: NORMAL
FECAL NEUTRAL FAT: NORMAL
GFR SERPL CREATININE-BSD FRML MDRD: >60 ML/MIN/1.73M2
GLUCOSE SERPL-MCNC: 131 MG/DL (ref 74–99)
HCT VFR BLD AUTO: 24.6 % (ref 34–48)
HCT VFR BLD AUTO: 25.5 % (ref 34–48)
HGB BLD-MCNC: 7.4 G/DL (ref 11.5–15.5)
HGB BLD-MCNC: 7.5 G/DL (ref 11.5–15.5)
INR PPP: 1.2
LIPASE SERPL-CCNC: 99 U/L (ref 13–60)
LYMPHOCYTES RELATIVE PERCENT: 38 % (ref 20–42)
MCH RBC QN AUTO: 22 PG (ref 26–35)
MCHC RBC AUTO-ENTMCNC: 30.1 G/DL (ref 32–34.5)
MCV RBC AUTO: 73.2 FL (ref 80–99.9)
MICROORGANISM SPEC CULT: NORMAL
MICROORGANISM SPEC CULT: NORMAL
MONOCYTES NFR BLD: 0.45 K/UL (ref 0.1–0.95)
MONOCYTES NFR BLD: 11 % (ref 2–12)
NEUTROPHILS NFR BLD: 49 % (ref 43–80)
NEUTS SEG NFR BLD: 2.01 K/UL (ref 1.8–7.3)
NUCLEATED RED BLOOD CELLS: 3 PER 100 WBC
PLATELET, FLUORESCENCE: 115 K/UL (ref 130–450)
PMV BLD AUTO: ABNORMAL FL (ref 7–12)
POTASSIUM SERPL-SCNC: 3.9 MMOL/L (ref 3.5–5)
PROT SERPL-MCNC: 7.2 G/DL (ref 6.4–8.3)
PROTHROMBIN TIME: 13.5 SEC (ref 9.3–12.4)
RBC # BLD AUTO: 3.36 M/UL (ref 3.5–5.5)
RBC # BLD: ABNORMAL 10*6/UL
SODIUM SERPL-SCNC: 137 MMOL/L (ref 132–146)
SPECIMEN DESCRIPTION: NORMAL
WBC OTHER # BLD: 4.1 K/UL (ref 4.5–11.5)

## 2024-02-25 PROCEDURE — 86256 FLUORESCENT ANTIBODY TITER: CPT

## 2024-02-25 PROCEDURE — 83690 ASSAY OF LIPASE: CPT

## 2024-02-25 PROCEDURE — 85014 HEMATOCRIT: CPT

## 2024-02-25 PROCEDURE — 85018 HEMOGLOBIN: CPT

## 2024-02-25 PROCEDURE — 6370000000 HC RX 637 (ALT 250 FOR IP): Performed by: INTERNAL MEDICINE

## 2024-02-25 PROCEDURE — 86255 FLUORESCENT ANTIBODY SCREEN: CPT

## 2024-02-25 PROCEDURE — G0378 HOSPITAL OBSERVATION PER HR: HCPCS

## 2024-02-25 PROCEDURE — 36415 COLL VENOUS BLD VENIPUNCTURE: CPT

## 2024-02-25 PROCEDURE — 85610 PROTHROMBIN TIME: CPT

## 2024-02-25 PROCEDURE — 6360000002 HC RX W HCPCS: Performed by: INTERNAL MEDICINE

## 2024-02-25 PROCEDURE — 6370000000 HC RX 637 (ALT 250 FOR IP): Performed by: NURSE PRACTITIONER

## 2024-02-25 PROCEDURE — 85025 COMPLETE CBC W/AUTO DIFF WBC: CPT

## 2024-02-25 PROCEDURE — 80053 COMPREHEN METABOLIC PANEL: CPT

## 2024-02-25 RX ORDER — CHOLESTYRAMINE 4 G/9G
0.5 POWDER, FOR SUSPENSION ORAL 2 TIMES DAILY
Qty: 90 PACKET | Refills: 3 | Status: SHIPPED | OUTPATIENT
Start: 2024-02-25

## 2024-02-25 RX ORDER — OSELTAMIVIR PHOSPHATE 75 MG/1
75 CAPSULE ORAL 2 TIMES DAILY
Qty: 3 CAPSULE | Refills: 0 | Status: SHIPPED | OUTPATIENT
Start: 2024-02-25 | End: 2024-02-27

## 2024-02-25 RX ORDER — CHOLESTYRAMINE 4 G/9G
0.5 POWDER, FOR SUSPENSION ORAL 2 TIMES DAILY
Status: DISCONTINUED | OUTPATIENT
Start: 2024-02-25 | End: 2024-02-25 | Stop reason: HOSPADM

## 2024-02-25 RX ADMIN — POTASSIUM CHLORIDE AND SODIUM CHLORIDE: 900; 150 INJECTION, SOLUTION INTRAVENOUS at 05:43

## 2024-02-25 RX ADMIN — FAMOTIDINE 20 MG: 20 TABLET, FILM COATED ORAL at 09:26

## 2024-02-25 RX ADMIN — OSELTAMIVIR PHOSPHATE 75 MG: 75 CAPSULE ORAL at 09:26

## 2024-02-25 RX ADMIN — LEVOTHYROXINE SODIUM 50 MCG: 0.05 TABLET ORAL at 05:41

## 2024-02-25 RX ADMIN — VANCOMYCIN HYDROCHLORIDE 250 MG: 10 INJECTION, POWDER, LYOPHILIZED, FOR SOLUTION INTRAVENOUS at 11:57

## 2024-02-25 RX ADMIN — CHOLESTYRAMINE 2 G: 4 POWDER, FOR SUSPENSION ORAL at 09:27

## 2024-02-25 RX ADMIN — VANCOMYCIN HYDROCHLORIDE 250 MG: 10 INJECTION, POWDER, LYOPHILIZED, FOR SOLUTION INTRAVENOUS at 06:00

## 2024-02-25 RX ADMIN — VANCOMYCIN HYDROCHLORIDE 250 MG: 10 INJECTION, POWDER, LYOPHILIZED, FOR SOLUTION INTRAVENOUS at 01:03

## 2024-02-25 RX ADMIN — METOPROLOL TARTRATE 50 MG: 50 TABLET, FILM COATED ORAL at 09:26

## 2024-02-25 NOTE — PROGRESS NOTES
Pharmacy Medication Reconciliation    The patient was interviewed over the phone regarding current PTA medication list, use and drug allergies. Patient was by herself. The patient was questioned regarding use of any other inhalers, topical products, over the counter medications, herbal medications, vitamin products or ophthalmic/nasal/otic medication use.      Allergy Update: Patient has no known allergies.    Recommendations/Findings/Discrepancies:   The following amendments were made to the patient's active medication list on file:   1) Additions: n/a    2) Deletions: B complex, Vitamin D, Silvadene, Zofran, and magnesium    3) Changes: n/a    Total number of discrepancies: 5    Source/s of information: patient, electronic medical record, and [employer]    Thank you,  Rosana Villasenor [facility]  2/22/2024, 2:44 PM   
Name:  Kyleigh Brantley  :  1965  MRN:  [id number]  Room:  [facility]/0438-01  DOS:  2024    [facility]  [facility]  Dr. Neida Mares M.D.  Dr. Rafal Cobb M.D.  [address]U [address] M.D.  Dr. Preston Duron D.O.    -NP Progress Note-    PCP:  Jose Nguyen DO  Admitting Physician:  Mark Evans DO  Chief Complaint:  No chief complaint on file.      Subjective  Patient sitting up in chair.  Bowel movements x 2 overnight, still loose.  Minimal abdominal discomfort.    Physical Examination  Vitals:  /80   Pulse 72   Temp 98.9 °F (37.2 °C) (Oral)   Resp 17   Ht 1.702 m (5' 7\")   Wt 131.5 kg (289 lb 12.8 oz)   LMP 2013 (Approximate)   SpO2 100%   BMI 45.39 kg/m²   General Appearance:  awake, alert, and oriented to person, place, time, and purpose; appears stated age and cooperative; no apparent distress no labored breathing  HEENT:  PERRL; EOMI; sclera clear; buccal mucosa moist  Neck:  supple; trachea midline; no thyromegaly; no JVD; no bruits  Heart:  rhythm regular; rate controlled; no murmurs  Lungs:  symmetrical; clear to auscultation bilaterally; no wheezes; no rhonchi; no rales  Abdomen:  soft, non-tender, non-distended; bowel sounds positive; no organomegaly or masses; no pain on palpation  Extremities:  peripheral pulses present; no peripheral edema; no ulcers  Neurologic:  alert and oriented x 3; no focal deficit; cranial nerves grossly intact  Skin:  no petechia; no hemorrhage; no wounds    Medications  Scheduled Meds    metoprolol  50 mg Oral BID    vancomycin  250 mg Oral 4 times per day    famotidine  20 mg Oral BID    sodium chloride flush  10 mL IntraVENous 2 times per day    levothyroxine  50 mcg Oral Daily    amLODIPine  5 mg Oral Daily    oseltamivir  75 mg Oral BID     Infusion Meds    0.9% NaCl with KCl 20 mEq 60 mL/hr at 24 0543    sodium chloride       PRN Meds sodium chloride flush, sodium chloride, 
Name:  Kyleigh Brantley  :  1965  MRN:  [id number]  Room:  [facility]/0438-02  DOS:  2024    [facility]  [facility]  Dr. Neida Mares M.D.  Dr. Rafal Cobb M.D.  Dr. Jose Wheeler D.O.  Dr. Kurtis Trinh M.D.  Dr. Preston Duron D.O.    -NP Progress Note-    PCP:  Jose Nguyen DO  Admitting Physician:  Mark Evans DO  Chief Complaint:  No chief complaint on file.      Subjective  Patient sitting up in bed.  BM x1 today.      Physical Examination  Vitals:  /70   Pulse 67   Temp 97.2 °F (36.2 °C) (Oral)   Resp 18   Ht 1.702 m (5' 7\")   Wt 128.5 kg (283 lb 3.2 oz)   LMP 2013 (Approximate)   SpO2 99%   BMI 44.36 kg/m²   General Appearance:  awake, alert, and oriented to person, place, time, and purpose; appears stated age and cooperative; no apparent distress no labored breathing  HEENT:  PERRL; EOMI; sclera clear; buccal mucosa moist  Neck:  supple; trachea midline; no thyromegaly; no JVD; no bruits  Heart:  rhythm regular; rate controlled; no murmurs  Lungs:  symmetrical; clear to auscultation bilaterally; no wheezes; no rhonchi; no rales  Abdomen:  soft, non-tender, non-distended; bowel sounds positive; no organomegaly or masses; no pain on palpation  Extremities:  peripheral pulses present; no peripheral edema; no ulcers  Neurologic:  alert and oriented x 3; no focal deficit; cranial nerves grossly intact  Skin:  no petechia; no hemorrhage; no wounds    Medications  Scheduled Meds    metoprolol  50 mg Oral BID    vancomycin  250 mg Oral 4 times per day    famotidine  20 mg Oral BID    sodium chloride flush  10 mL IntraVENous 2 times per day    levothyroxine  50 mcg Oral Daily    amLODIPine  5 mg Oral Daily    oseltamivir  75 mg Oral BID     Infusion Meds    0.9% NaCl with KCl 20 mEq      sodium chloride       PRN Meds sodium chloride flush, sodium chloride, promethazine **OR** ondansetron, polyethylene glycol, acetaminophen **OR** 
Patient was initially assigned to Hospitalist service, but based on PCP, patient goes to Dr Evans's service  Discussed the patient with Dr Evans, gave reports and patient is transferred to his service and Dr Evans with admit the patient.  
Pt was for colonoscopy today.  PT had stool C diff come back positive at around 130pm today.  She has not had any antibiotics yet.  WBC normal and afebrile.  Also positive for Influena B.  Pt currently normal vitals with no signs of hemorrhagic shock or active bleeding.  Will cancel colonoscopy today.  I did d/w charge nurse.  Will put on regular diet, serial Hg q 6 hours, EGD yesterday reviewed and will stop PPI and switch to pepcid in light of C diff, 2 units of PRBC on hold.  If outward bleeding or drop in Hg will check NM bleeding scan.  Our service will follow.  Dr. Cobb covering our service this evening and weekend.      US RUQ and CT 2/21/24 reviewed.  I did order Acute Hep Panel for elevated LFT's, GB out, CBD normal.  Unable to order smooth muscle antibody.  Fatty liver noted.    Jose Wheeler D.O.  2/23/24  [id number]  
SBAR form completed. Placed on chart. PACU nurse to  come recover in Endo due to isolation. Nurse to Nurse report given.  
Stool sample sent to lab for analysis.  
heart rate of 67 and blood pressure 116/70.  O2 sat 99% room air at rest.  BUN/creatinine 7/0.8 with normal electrolytes except for CO2 of 19.   persistent elevation of transaminases  and .  WBC 4.9 hemoglobin 7.3.  Platelet count is 98.  Stools for C Diff yesterday afternoon was positive and started on oral vancomycin 250 mg every 6 hours.    Past Medical History:    Past Medical History:   Diagnosis Date    Cancer (HCC)     L breast     Degenerative arthritis     of lower spine    GERD (gastroesophageal reflux disease)     Headache(784.0)     Hypertension     Hypothyroidism     Malignant neoplasm of lower-outer quadrant of breast of female, estrogen receptor positive (HCC) 2/1/2022    Obesity     Osteoarthritis      Past Surgical History:    Past Surgical History:   Procedure Laterality Date    CHOLECYSTECTOMY      COLONOSCOPY      CYST REMOVAL      from hand    ENDOSCOPY, COLON, DIAGNOSTIC      TONSILLECTOMY      TUBAL LIGATION      UPPER GASTROINTESTINAL ENDOSCOPY N/A 2/22/2024    ESOPHAGOGASTRODUODENOSCOPY performed by Rafal Cobb MD at [facility] ENDOSCOPY       Medications Prior to Admission:    @  Prior to Admission medications    Medication Sig Start Date End Date Taking? Authorizing Provider   amLODIPine (NORVASC) 5 MG tablet Take 1 tablet by mouth daily 6/12/15   Nereida Hernández MD   pantoprazole (PROTONIX) 40 MG tablet Take 1 tablet by mouth daily 4/29/15   Melissa Chinchilla MD   levothyroxine (SYNTHROID) 50 MCG tablet Take 1 tablet by mouth daily 4/18/15   Melissa Chinchilla MD   metoprolol (LOPRESSOR) 100 MG tablet Take 1 tablet by mouth 2 times daily 5/10/15   Melissa Chinchilla MD   spironolactone (ALDACTONE) 50 MG tablet Take 1 tablet by mouth daily 4/29/15   Melissa Chinchilla MD       Allergies:  Patient has no known allergies.    Social History:   Social History     Socioeconomic History    Marital status:      Spouse name: Not on file    Number of children: 2 
02/23/2024 08:44 AM     Magnesium:  No results found for: \"MG\"  Phosphorus:  No results found for: \"PHOS\"  PT/INR:    Lab Results   Component Value Date/Time    PROTIME 14.6 02/22/2024 09:20 AM    INR 1.3 02/22/2024 09:20 AM     Troponin:  No results found for: \"TROPONINI\"  U/A:    Lab Results   Component Value Date/Time    COLORU Dark Yellow 02/21/2024 07:00 PM    PROTEINU 100 02/21/2024 07:00 PM    PHUR 6.0 02/21/2024 07:00 PM    WBCUA 6 TO 9 02/21/2024 07:00 PM    RBCUA 0 TO 2 02/21/2024 07:00 PM    BACTERIA TRACE 02/21/2024 07:00 PM    CLARITYU Clear 09/05/2014 04:52 PM    SPECGRAV >1.030 02/21/2024 07:00 PM    LEUKOCYTESUR NEGATIVE 02/21/2024 07:00 PM    UROBILINOGEN 1.0 02/21/2024 07:00 PM    BILIRUBINUR LARGE 02/21/2024 07:00 PM    BLOODU Negative 09/05/2014 04:52 PM    GLUCOSEU NEGATIVE 02/21/2024 07:00 PM     ABG:  No results found for: \"PH\", \"PCO2\", \"PO2\", \"HCO3\", \"BE\", \"THGB\", \"TCO2\", \"O2SAT\"  HgBA1c:  No results found for: \"LABA1C\"  FLP:  No results found for: \"TRIG\", \"HDL\", \"LDLCALC\", \"LDLDIRECT\"  TSH:    Lab Results   Component Value Date/Time    TSH 1.100 03/19/2014 03:01 PM     IRON:    Lab Results   Component Value Date/Time    IRON 130 02/22/2024 09:20 AM     LIPASE:    Lab Results   Component Value Date/Time    LIPASE 96 02/21/2024 07:00 PM       ASSESSMENT AND PLAN:      Patient Active Problem List    Diagnosis Date Noted    GIB (gastrointestinal bleeding) 02/22/2024    Malignant neoplasm of lower-outer quadrant of breast of female, estrogen receptor positive (HCC) 02/01/2022    HTN (hypertension) 05/21/2015    Obesity 05/21/2015    Family history of premature CAD 05/21/2015    GERD (gastroesophageal reflux disease) 05/21/2015     Impression:  1.  Acute microcytic   anemia-secondary to GI blood loss  2.  Acute influenza B infection  3.  Acute kidney injury-improved  4.  Lactic acidosis secondary to #1  5.  Leukopenia, thrombocytopenia  6.  History hypertension  7.  History of hypothyroidism  8.

## 2024-02-25 NOTE — CARE COORDINATION
2-25-Cm note: Pt discharging on oral Vancomycin suspension, [facility] has to order the RX (they called other [facility] in the area, no-one has it)  and won't have it until tomorrow, pt is aware, she has another dose due at 6PM. NO PRECERT for the RX. Electronically signed by Poonam Garrett RN on 2/25/2024 at 2:23 PM    
[phone] Fax: [phone]      Notes:    Factors facilitating achievement of predicted outcomes: Family support    Barriers to discharge: none     Additional Case Management Notes: 2-22-Cm note: spoke to pt via phone (Flu Pos) , pt lives alone, she is independent, no DME, no hx of SNF or HHC, pt plans on home at dc with no needs, family will transport. Electronically signed by Poonam Garrett RN on 2/22/2024 at 1:49 PM    The Plan for Transition of Care is related to the following treatment goals of GIB (gastrointestinal bleeding) [K92.2]      Poonam Garrett RN  Case Management Department  Ph: [phone] Fax: [phone]

## 2024-02-25 NOTE — DISCHARGE INSTRUCTIONS
Your information:  Name: Kyleigh Brantley  : 1965    Your instructions:    You are being discharged home.  Please make and keep all follow-up appointments.  Take all medications as prescribed.    IF YOU EXPERIENCE ANY OF THE FOLLOWING SYMPTOMS SHORTNESS OF BREATH, INCREASED WEAKNESS OR FATIGUE, LIGHTHEADEDNESS, DIZZINESS, LOSS OF CONSCIOUSNESS, HEADACHES, BLOODY OR DARK, TARRY STOOLS, LOSS OF APPETITE, GENERAL NOT FEELING WELL, SIGNS AND SYMPTOMS OF INFECTION LIKE FEVER AND OR CHILLS, PLEASE CALL THE DOCTOR OR RETURN TO THE EMERGENCY ROOM.     What to do after you leave the hospital:    Recommended diet: regular diet    Recommended activity: activity as tolerated        The following personal items were collected during your admission and were returned to you:    Belongings  Dental Appliances: None  Vision - Corrective Lenses: Eyeglasses  Hearing Aid: None  Clothing: Footwear, Jacket/Coat, Pants, Shirt, Socks, Undergarments, At bedside  Jewelry: None  Body Piercings Removed: N/A  Electronic Devices: Cell Phone  Weapons (Notify Protective Services/Security): None  Other Valuables: Purse  Home Medications: None  Valuables Given To: Patient  Provide Name(s) of Who Valuable(s) Were Given To: pt  Responsible person(s) in the waiting room: n/a  Patient approves for provider to speak to responsible person post operatively: Yes    Information obtained by:  By signing below, I understand that if any problems occur once I leave the hospital I am to contact Dr. Nguyen.  I understand and acknowledge receipt of the instructions indicated above.

## 2024-02-25 NOTE — DISCHARGE SUMMARY
tablet by mouth daily 4/29/15   Melissa Chinchilla MD   levothyroxine (SYNTHROID) 50 MCG tablet Take 1 tablet by mouth daily 4/18/15   Melissa Chinchilla MD   metoprolol (LOPRESSOR) 100 MG tablet Take 1 tablet by mouth 2 times daily 5/10/15   Melissa Chinchilla MD   spironolactone (ALDACTONE) 50 MG tablet Take 1 tablet by mouth daily 4/29/15   Melissa Chinchilla MD       Allergies:  Patient has no known allergies.    Social History:   Social History     Socioeconomic History    Marital status:      Spouse name: Not on file    Number of children: 2    Years of education: High school diploma     Highest education level: Not on file   Occupational History    Not on file   Tobacco Use    Smoking status: Former     Current packs/day: 0.00     Average packs/day: 0.3 packs/day for 7.0 years (1.8 ttl pk-yrs)     Types: Cigarettes     Start date: 1988     Quit date: 1995     Years since quittin.1    Smokeless tobacco: Never   Substance and Sexual Activity    Alcohol use: Yes     Alcohol/week: 5.0 standard drinks of alcohol     Types: 5 Shots of liquor per week    Drug use: No    Sexual activity: Yes     Partners: Male   Other Topics Concern    Not on file   Social History Narrative    Not on file     Social Determinants of Health     Financial Resource Strain: Not on file   Food Insecurity: No Food Insecurity (2024)    Hunger Vital Sign     Worried About Running Out of Food in the Last Year: Never true     Ran Out of Food in the Last Year: Never true   Transportation Needs: No Transportation Needs (2024)    PRAPARE - Transportation     Lack of Transportation (Medical): No     Lack of Transportation (Non-Medical): No   Physical Activity: Not on file   Stress: Not on file   Social Connections: Not on file   Intimate Partner Violence: Not on file   Housing Stability: Low Risk  (2024)    Housing Stability Vital Sign     Unable to Pay for Housing in the Last Year: No     Number of

## 2024-02-26 LAB
HAV IGM SERPL QL IA: NONREACTIVE
HBV CORE IGM SERPL QL IA: NONREACTIVE
HBV SURFACE AG SERPL QL IA: NONREACTIVE
HCV AB SERPL QL IA: NONREACTIVE

## 2024-02-27 LAB — CALPROTECTIN, FECAL: 52 UG/G

## 2024-02-28 LAB — SMA IGG SER-ACNC: NEGATIVE

## 2024-06-06 ENCOUNTER — DOCUMENTATION (OUTPATIENT)
Dept: GASTROENTEROLOGY | Facility: HOSPITAL | Age: 59
End: 2024-06-06
Payer: COMMERCIAL

## 2024-06-06 NOTE — PROGRESS NOTES
Dr. Sapp's office received a referral from Dr. Zaid Lindsey for this patient to undergo a balloon enteroscopy. Dr. Blue reviewed the referral on 6/04/2024. Per Dr. Blue, OK to schedule.    [facility] schedulers were notified to call and schedule this patient. Contact Virgie Charlton RN at [phone] for any questions.      See below for supporting clinical information from the referral sent by Dr. Lindsey's office:    BRANDO secondary to blood loss  Angiodysplasia of stomach and duodenum without bleeding    Capsule Endoscopy Report 5/15/2024:  Reason: BRANDO  Stomach 00:12:26  Duodenum 00:13:11  Angioectasia 02:19:18 and 02:19:32  Cecum 04:18:00  Findings: Angioectasia at 2 hours and 19 minutes. No active bleeding. Cecum visualized    Colonoscopy 4/19/2024 performed by Dr. Lindsey:  Indications: BRANDO  One 5 mm polyp in the descending colon, removed with hot snare.   Internal hemorrhoids    EGD February showed small hiatal hernia performed by Dr. Perez.

## 2024-09-13 NOTE — H&P (VIEW-ONLY)
BREAST SURVIVORSHIP    PATIENT NAME: Alicja Romero  MRN: [id number]      REASON FOR VISIT:  Annual Exam and DBT Mammogram      HISTORY of PRESENT ILLNESS:  Alicja Romero is a 59 year old year old -American postmenopausal   from [place] who returns to the [facility] today for annual exam  and  DBT mammogram.     She is an established patient who I last saw on 8/4/23 for annual exam and DBT mammogram with negative findings.     In 2/4, she was hospitalized for what she reports is severe anemia due to intestinal bleeding. 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     History pertaining to prior breast biopsies, genetic reports, pathology reports, treatment summaries, personal, social and family history has been extracted from my note dated 8/4/23.      BREAST CANCER HISTORY:   She presented with an abnormal mammogram  2021 at OSH.  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 Left diagnostic mammogram was completed at [facility] showing calcifications which spanned 5 cm x 3.5 x 3.3 cm.     She subsequently had a Delilah Bracketed Lumpectomy 12/7/21 with Dr Marry Burkett.  Pathology revealed a micro invasive carcinoma in the back ground of DCIS with mucinous features, NG 1-2. Based on danielle foci of DCIS were noted to involve 7 cm. 0 sentinel lymph nodes were removed, and 0 were involved by tumor. Margins were negative. There was no evidence of angiolymphatic invasion.    Pre-operative MRI: was not  "performed.  Has Patient had Genetic Testing? No   Neoadjuvant Chemotherapy: none  Radiation Therapy: Whole breast irradiation completed 3/2/22 with Dr Fish West, III at [facility]  Adjuvant Chemotherapy:  none.   Adjuvant Hormonal Therapy:  Started Tamoxifen 2021-stopped due to poor tolerance and started Arimidex 2022 which she stopped in    Oncotype recurrence score:  not done     Her vitamin D level was No results found for: \"VITD25\".   She takes vitamin D 2000 units.  BMD: Yes, per patient report in ; results: Normal     REVIEW OF SYSTEMS:   She denies chest pain, shortness of breath, persistent cough, severe headaches, unusual bony pains, abdominal pain or unintentional weight loss.      CANCER SURVEILLANCE:  Mammograms: Yes, Date in Epic: 23 results - Negative anna  Breast MRI: No  Colonoscopy: Yes, per patient report in , normal     RISK FACTORS FOR BREAST CANCER:  Age at the onset of menses:  16 years of age.   P: 2   Age at the birth of first child:  17 years of age.  She did not breast feed  Age at menopause:  46 years of age.   Post-menopausal hormone therapy: Yes, 3 months  She has an intact uterus and ovaries   History of Mantle Radiation prior to the age of 31: No  Obesity:  Yes,  Body mass index is 44.64 kg/m²., Current Weight: 285 lbs  Mammographic density: There are scattered fibroglandular densities  Personal History of Benign Atypical Breast Biopsy: No  Alcohol use: 1-2 per day    PAST MEDICAL HISTORY:  PAST MEDICAL HISTORY   Diagnosis Date   • Acquired hypothyroidism 2021   • Class 3 severe obesity due to excess calories without serious comorbidity with body mass index (BMI) of 40.0 to 44.9 in adult (HCC) 2021   • Gastroesophageal reflux disease without esophagitis 2021   • Migraines 2021   • CHANNING (obstructive sleep apnea) 2021   • Primary hypertension 2021       Patient specifically denies history of: DVT, PE, abnormal uterine " bleeding, abnormal uterine biopsies. + Migraine headaches and Osteoporosis    PAST SURGICAL HISTORY:  PAST SURGICAL HISTORY   Procedure Laterality Date   • BREAST LUMPECTOMY HX Left    • CHOLECYSTECTOMY HX     • PAST SURGICAL HISTORY OF      tubal ligation   • PAST SURGICAL HISTORY OF      cysts removed from wrist   • PAST SURGICAL HISTORY OF      endometrial ablation       SOCIAL HISTORY:  Social History     Tobacco Use   • Smoking status: Former     Current packs/day: 0.00     Types: Cigarettes     Quit date:      Years since quittin.7   • Smokeless tobacco: Never   Vaping Use   • Vaping status: Never Used   Substance Use Topics   • Alcohol use: Yes     Comment: daily - 1-2   • Drug use: Never     Caffeine intake: 2 candy bars and one soda per day  Exercise: Never    FAMILY HISTORY:  Family history of breast cancer:  Maternal Great Aunt   Family history of ovarian cancer: None  Ashkenazi Ancestry: no  Other Cancer: MGM dx colon cancer age 68-  Maternal Aunt: 2  Paternal Aunt: 2  Sisters: 2  There is no family history of prostate, uterine, pancreatic, gastric, brain, renal cell or thyroid cancer.   There is no family history of melanoma, sarcoma or leukemia.      Osteoporosis: None  Stroke: Mother  Blood Clot: None  Heart attack: None  Thyroid Nodule or Goiter: None  Autism: None    FAMILY HISTORY    Problem Relation Age of Onset   • Stroke Mother    • Blood Clots Daughter         during pregnancy   • Colon Cancer Maternal Grandmother 67   • Breast Cancer Other         maternal great aunt   • Anesthesia Problems No Family History    • Malig Hyperthermia No Family History        MEDICATIONS:  • sulfamethoxazole-trimethoprim (BACTRIM DS) 800-160 mg per tablet Take 1 tablet by mouth once daily as needed.   • docusate sodium (COLACE) 100 mg capsule Take 100 mg by mouth twice daily as needed for constipation.   • Methylcellulose, Laxative, (CITRUCEL) 500 mg tab Take 1,000 mg by mouth once daily.   •  "amLODIPine (NORVASC) 5 mg tablet Take 1 tablet by mouth once daily.   • Cholecalciferol, Vitamin D3, 50 mcg (2,000 unit) cap Take by mouth.   • levothyroxine (SYNTHROID) 50 mcg tablet Take 1 tablet by mouth q 24 HR.   • Magnesium Gluconate 30 mg (550 mg) tab as directed.   • metoprolol tartrate, short acting, (LOPRESSOR) 100 mg tablet Take 100 mg by mouth twice daily.   • pantoprazole DR (PROTONIX) 40 mg tablet Take 40 mg by mouth once daily as needed.   • spironolactone (ALDACTONE) 50 mg tablet Take 50 mg by mouth once daily.   • SUMAtriptan (IMITREX) 25 mg tablet Take 1 tablet by mouth as needed.       ALLERGIES: ALLERGIES  No Known Allergies    Annual PCP Team Chronic Disease Visit Never done  Depression Screening Never done  Anxiety Screening Never done  Hepatitis C Screening Never done  HIV Screening Never done  BP Controlled (<130/80) Never done  DTaP,Tdap,Td Vaccine(1 - Tdap) Never done  Hepatitis B Vaccine(1 of 3 - 19+ 3-dose series) Never done  Cervical Cancer Screening Never done  Colorectal Cancer Screening Never done  Shingrix Vaccine(1 of 2) Never done  Mammogram Screening due on 08/04/2024  Covid-19 Vaccine(1 - 2023-24 season) Never done  Influenza Vaccine(1) due on 09/01/2024    PHYSICAL EXAMINATION:   Ht 170.2 cm (5' 7\")   Wt 129.3 kg (285 lb)   BMI 44.64 kg/m²   General appearance: Well appearing, alert, in no acute distress, well-hydrated, well nourished., Overweight  Lymph nodes- The supraclavicular, axillary, and cervical regions are free of significant lymphadenopathy. Palpable node noted in left axilla    Right breast-The skin, nipple and areola appear normal. There is no skin dimpling with movement of the pectoralis. There is no nipple retraction. No discharge can be elicited. The parenchya is mildly fibrocystic. There is no dominant masses in the breast. The axillary tail is normal. There is no tenderness noted with palpation.     Left breast-S/p partial mastectomy with no evidence of local " recurrence. Moderate XRT changes noted along incision. Slight skin retraction which is chronic due to XRT along incision.  The skin, nipple, and areola appear normal. There is no skin dimpling with movement of the pectoralis. There is no nipple retraction. No discharge can be elicited. The parenchyma is mildly fibrocystic. There is no dominant masses in the breast. The axillary tail is normal. There is no tenderness noted with palpation.   Chest: cl  Cor: reg, no r/m/g  Abd: soft, no hepatomegaly    IMAGING:   Digital Breast Tomosynthesis was performed today and the patient will be notified of the results.  There are scattered areas   of fibroglandular density. There are benign post operative findings in the left breast. There is no mammographic evidence of malignancy. A 1 year screening mammogram is recommended.       Assessment       IMPRESSION/PLAN:  Bilateral fibrocystic change, History of breast cancer, Intolerant to AI and excess weight    There is no evidence of malignancy. The clinical and mammographic breast findings were discussed in detail.  Results released to My Chart as agreed      Encouraged follow up with PCP to discuss BW and elevated liver enzymes. She reports history of fatty liver    Genetics referral made:  no, Reason: N/A at this time    Chemoprevention discussion: she stopped use of Arimidex after one year in the past couple weeks. She has no desire to resume its use.    Breast MRI is NOT ROUTINELY recommended for screening following a cancer diagnosis and is NOT recommended for women who have undergone bilateral mastectomy procedures unless otherwise clinically indicated. MRI is recommended for breast cancer survivors with remaining breast tissue who:   - Have a history of chest irradiation under the age of 30.   - Carry germline mutations conferring increased cancer risk (BRCA1, BRCA2,  PTEN, TP53, CDH1, STK11, PALB2, CHEK2, OLAMIDE)  Per [facility] High Risk Care Path recommendations, screening breast  MRI may be considered for women under the age of 65 with remaining breast tissue in the following situations:    - Age at breast cancer diagnosis under 50   - Mammographically dense tissue (BI-RADS category 3 or 4)   - History of invasive lobular breast cancer   Due to breast density and desire to discontinue AI, I will explore use of FAST MRI of breast in Fall     NCCN Principles of Healthy Lifestyles were discussed with the patient, including weight management, physical activity, and healthy dietary habits. Encouraged embark on diet and exercise routine which is limited due to fatigue from anemia      Special Referrals: Lymphodema Management  Patient education materials/brochures provided today: None   Recommendations: She understands the importance of weight reduction for overall health and breast cancer risk reduction   Annual PCP Team Chronic Disease Visit Never done  Depression Screening Never done  Anxiety Screening Never done  Hepatitis C Screening Never done  HIV Screening Never done  BP Controlled (<130/80) Never done  DTaP,Tdap,Td Vaccine(1 - Tdap) Never done  Hepatitis B Vaccine(1 of 3 - 19+ 3-dose series) Never done  Cervical Cancer Screening Never done  Colorectal Cancer Screening Never done  Shingrix Vaccine(1 of 2) Never done  Mammogram Screening due on 08/04/2024  Covid-19 Vaccine(1 - 2023-24 season) Never done  Influenza Vaccine(1) due on 09/01/2024    Follow up visits are recommended every six months for five years and annually thereafter. She will return in 6 months for follow-up evaluation  She will call me in the interim should she have any questions or concerns.    I spent a total of 28 minutes on the date of the service which included preparing to see the patient, face-to-face patient care, completing clinical documentation, obtaining and/or reviewing separately obtained history, performing a medically appropriate examination, counseling and educating the patient/family/caregiver, ordering  medications, tests, or procedures, and communicating results to the patient/family/caregiver.         HAILEE [email or web address].CNP  Medical Breast Specialist  [facility]          CC:   Alton Red DO Erica Peters  [address]  [place] [address]

## 2024-09-30 ENCOUNTER — HOSPITAL ENCOUNTER (EMERGENCY)
Age: 59
Discharge: HOME | End: 2024-09-30
Payer: COMMERCIAL

## 2024-09-30 ENCOUNTER — APPOINTMENT (OUTPATIENT)
Dept: GENERAL RADIOLOGY | Age: 59
End: 2024-09-30
Payer: COMMERCIAL

## 2024-09-30 ENCOUNTER — APPOINTMENT (OUTPATIENT)
Dept: CT IMAGING | Age: 59
End: 2024-09-30
Payer: COMMERCIAL

## 2024-09-30 VITALS
HEART RATE: 67 BPM | WEIGHT: 293 LBS | OXYGEN SATURATION: 97 % | TEMPERATURE: 98.2 F | SYSTOLIC BLOOD PRESSURE: 193 MMHG | RESPIRATION RATE: 16 BRPM | DIASTOLIC BLOOD PRESSURE: 100 MMHG | BODY MASS INDEX: 45.95 KG/M2

## 2024-09-30 DIAGNOSIS — S86.912A KNEE STRAIN, LEFT, INITIAL ENCOUNTER: ICD-10-CM

## 2024-09-30 DIAGNOSIS — S39.012A STRAIN OF LUMBAR REGION, INITIAL ENCOUNTER: Primary | ICD-10-CM

## 2024-09-30 PROCEDURE — 6360000002 HC RX W HCPCS: Performed by: NURSE PRACTITIONER

## 2024-09-30 PROCEDURE — 99284 EMERGENCY DEPT VISIT MOD MDM: CPT

## 2024-09-30 PROCEDURE — 73560 X-RAY EXAM OF KNEE 1 OR 2: CPT

## 2024-09-30 PROCEDURE — 6370000000 HC RX 637 (ALT 250 FOR IP): Performed by: NURSE PRACTITIONER

## 2024-09-30 PROCEDURE — 96372 THER/PROPH/DIAG INJ SC/IM: CPT

## 2024-09-30 PROCEDURE — 72131 CT LUMBAR SPINE W/O DYE: CPT

## 2024-09-30 RX ORDER — OXYCODONE HYDROCHLORIDE 5 MG/1
10 TABLET ORAL ONCE
Status: COMPLETED | OUTPATIENT
Start: 2024-09-30 | End: 2024-09-30

## 2024-09-30 RX ORDER — ACETAMINOPHEN 160 MG
2000 TABLET,DISINTEGRATING ORAL DAILY
COMMUNITY

## 2024-09-30 RX ORDER — FAMOTIDINE 20 MG/1
20 TABLET, FILM COATED ORAL
Qty: 14 TABLET | Refills: 0 | Status: SHIPPED | OUTPATIENT
Start: 2024-09-30 | End: 2024-10-07

## 2024-09-30 RX ORDER — MORPHINE SULFATE 10 MG/ML
5 INJECTION, SOLUTION INTRAMUSCULAR; INTRAVENOUS ONCE
Status: DISCONTINUED | OUTPATIENT
Start: 2024-09-30 | End: 2024-09-30

## 2024-09-30 RX ORDER — KETOROLAC TROMETHAMINE 30 MG/ML
60 INJECTION, SOLUTION INTRAMUSCULAR; INTRAVENOUS ONCE
Status: COMPLETED | OUTPATIENT
Start: 2024-09-30 | End: 2024-09-30

## 2024-09-30 RX ORDER — PREDNISONE 20 MG/1
40 TABLET ORAL DAILY
Qty: 10 TABLET | Refills: 0 | Status: SHIPPED | OUTPATIENT
Start: 2024-09-30 | End: 2024-10-05

## 2024-09-30 RX ORDER — THIAMINE MONONITRATE (VIT B1) 100 MG
100 TABLET ORAL DAILY
COMMUNITY

## 2024-09-30 RX ORDER — CYCLOBENZAPRINE HCL 10 MG
10 TABLET ORAL ONCE
Status: COMPLETED | OUTPATIENT
Start: 2024-09-30 | End: 2024-09-30

## 2024-09-30 RX ADMIN — CYCLOBENZAPRINE 10 MG: 10 TABLET, FILM COATED ORAL at 19:38

## 2024-09-30 RX ADMIN — KETOROLAC TROMETHAMINE 60 MG: 60 INJECTION, SOLUTION INTRAMUSCULAR at 19:39

## 2024-09-30 ASSESSMENT — LIFESTYLE VARIABLES
HOW OFTEN DO YOU HAVE A DRINK CONTAINING ALCOHOL: 4 OR MORE TIMES A WEEK
HOW MANY STANDARD DRINKS CONTAINING ALCOHOL DO YOU HAVE ON A TYPICAL DAY: 1 OR 2

## 2024-09-30 ASSESSMENT — PAIN DESCRIPTION - LOCATION
LOCATION: BACK;KNEE
LOCATION: BACK;KNEE

## 2024-09-30 ASSESSMENT — PAIN SCALES - GENERAL
PAINLEVEL_OUTOF10: 8
PAINLEVEL_OUTOF10: 8

## 2024-09-30 ASSESSMENT — PAIN DESCRIPTION - DESCRIPTORS
DESCRIPTORS: ACHING;DULL;NAGGING
DESCRIPTORS: ACHING

## 2024-09-30 ASSESSMENT — PAIN - FUNCTIONAL ASSESSMENT: PAIN_FUNCTIONAL_ASSESSMENT: 0-10

## 2024-09-30 ASSESSMENT — PAIN DESCRIPTION - ORIENTATION: ORIENTATION: LOWER

## 2024-09-30 NOTE — ED PROVIDER NOTES
Independent LUZMARIA Visit.     HPI:  9/30/24, Time: 7:59 PM EDT         Kyleigh Brantley is a 59 y.o. female presenting to the ED for left knee pain and lumbar spine pain, beginning  days ago.  The complaint has been persistent, moderate in severity, and worsened by nothing.  Patient stated that she did have any injury. No trauma or falls. No fever or chills. No concern for dvt.  Denied urinary issues. Nothing makes better or worse.     ROS:   Pertinent positives and negatives are stated within HPI, all other systems reviewed and are negative.  --------------------------------------------- PAST HISTORY ---------------------------------------------  Past Medical History:  has a past medical history of Cancer (HCC), Degenerative arthritis, GERD (gastroesophageal reflux disease), Headache(784.0), Hypertension, Hypothyroidism, Malignant neoplasm of lower-outer quadrant of breast of female, estrogen receptor positive (HCC), Obesity, and Osteoarthritis.    Past Surgical History:  has a past surgical history that includes Cholecystectomy; Tonsillectomy; Tubal ligation; cyst removal; Colonoscopy; Endoscopy, colon, diagnostic; and Upper gastrointestinal endoscopy (N/A, 2/22/2024).    Social History:  reports that she quit smoking about 29 years ago. Her smoking use included cigarettes. She started smoking about 36 years ago. She has a 1.8 pack-year smoking history. She has never used smokeless tobacco. She reports current alcohol use of about 12.0 - 19.0 standard drinks of alcohol per week. She reports that she does not use drugs.    Family History: family history includes Cancer in her maternal grandmother; Coronary Art Dis in her mother; Heart Attack in her maternal grandfather and mother; Heart Failure in her father; Heart Surgery in her mother; Hypertension in her father, mother, sister, and sister.     The patient’s home medications have been reviewed.    Allergies: Patient has no known  allergies.    ---------------------------------------------------PHYSICAL EXAM--------------------------------------    Constitutional/General: Alert and oriented x3, well appearing, non toxic in NAD  Head: Normocephalic and atraumatic  Eyes: PERRL, EOMI  Mouth: Oropharynx clear, handling secretions, no trismus  Neck: Supple, full ROM, non tender to palpation in the midline, no stridor, no crepitus, no meningeal signs  Pulmonary: Lungs clear to auscultation bilaterally, no wheezes, rales, or rhonchi. Not in respiratory distress  Cardiovascular:  Regular rate. Regular rhythm. No murmurs, gallops, or rubs. 2+ distal pulses  Chest: no chest wall tenderness  Abdomen: Soft.  Non tender. Non distended.  +BS.  No rebound, guarding, or rigidity. No pulsatile masses appreciated.  Musculoskeletal: Moves all extremities x 4. Warm and well perfused, no clubbing, cyanosis, or edema. Capillary refill <3 seconds  Skin: warm and dry. No rashes.   Neurologic: GCS 15, CN 2-12 grossly intact, no focal deficits, symmetric strength 5/5 in the upper and lower extremities bilaterally  Psych: Normal Affect  + left knee pain with palpitation  ++ lumbar spine pain with palpitation     -------------------------------------------------- RESULTS -------------------------------------------------  I have personally reviewed all laboratory and imaging results for this patient. Results are listed below.     LABS:  No results found for this visit on 09/30/24.    RADIOLOGY:  Interpreted by Radiologist.  XR KNEE LEFT (1-2 VIEWS)   Final Result   No acute abnormality of the knee.      Patellofemoral compartment degenerative changes.         CT LUMBAR SPINE WO CONTRAST    (Results Pending)         ------------------------- NURSING NOTES AND VITALS REVIEWED ---------------------------   The nursing notes within the ED encounter and vital signs as below have been reviewed by myself.  BP (!) 193/100   Pulse 67   Temp 98.2 °F (36.8 °C) (Oral)   Resp 16

## 2024-10-01 NOTE — DISCHARGE INSTRUCTIONS
Use the prednisone and pepcid    Can then use ibuprofen as needed    Use the zanaflex for spasms    Followup with ortho for knee

## 2024-10-04 RX ORDER — LANOLIN ALCOHOL/MO/W.PET/CERES
1 CREAM (GRAM) TOPICAL DAILY
COMMUNITY

## 2024-10-04 RX ORDER — FAMOTIDINE 20 MG/1
20 TABLET, FILM COATED ORAL
COMMUNITY
Start: 2024-09-30 | End: 2024-10-07

## 2024-10-04 RX ORDER — ZOLMITRIPTAN 5 MG/1
TABLET, FILM COATED ORAL
COMMUNITY
Start: 2024-03-01

## 2024-10-04 RX ORDER — PANTOPRAZOLE SODIUM 40 MG/1
40 TABLET, DELAYED RELEASE ORAL
COMMUNITY

## 2024-10-04 RX ORDER — SUMATRIPTAN SUCCINATE 25 MG/1
1 TABLET ORAL AS NEEDED
COMMUNITY

## 2024-10-04 RX ORDER — ACETAMINOPHEN 500 MG
1 TABLET ORAL DAILY
COMMUNITY

## 2024-10-10 ENCOUNTER — ANESTHESIA (OUTPATIENT)
Dept: GASTROENTEROLOGY | Facility: HOSPITAL | Age: 59
End: 2024-10-10
Payer: COMMERCIAL

## 2024-10-10 ENCOUNTER — HOSPITAL ENCOUNTER (OUTPATIENT)
Dept: GASTROENTEROLOGY | Facility: HOSPITAL | Age: 59
Discharge: HOME | End: 2024-10-10
Payer: COMMERCIAL

## 2024-10-10 ENCOUNTER — ANESTHESIA EVENT (OUTPATIENT)
Dept: GASTROENTEROLOGY | Facility: HOSPITAL | Age: 59
End: 2024-10-10
Payer: COMMERCIAL

## 2024-10-10 VITALS
HEART RATE: 62 BPM | SYSTOLIC BLOOD PRESSURE: 133 MMHG | TEMPERATURE: 96.8 F | OXYGEN SATURATION: 97 % | RESPIRATION RATE: 16 BRPM | WEIGHT: 293 LBS | HEIGHT: 67 IN | BODY MASS INDEX: 45.99 KG/M2 | DIASTOLIC BLOOD PRESSURE: 80 MMHG

## 2024-10-10 DIAGNOSIS — K55.20 ANGIODYSPLASIA: ICD-10-CM

## 2024-10-10 DIAGNOSIS — D50.0 IRON DEFICIENCY ANEMIA DUE TO CHRONIC BLOOD LOSS: ICD-10-CM

## 2024-10-10 PROBLEM — D64.9 ANEMIA: Status: ACTIVE | Noted: 2024-10-10

## 2024-10-10 PROBLEM — E03.9 HYPOTHYROIDISM: Status: ACTIVE | Noted: 2024-10-10

## 2024-10-10 PROBLEM — E66.9 OBESITY: Status: ACTIVE | Noted: 2024-10-10

## 2024-10-10 PROBLEM — C50.919 BREAST CANCER (MULTI): Status: ACTIVE | Noted: 2024-10-10

## 2024-10-10 PROBLEM — Z86.69 HISTORY OF MIGRAINE HEADACHES: Status: ACTIVE | Noted: 2024-10-10

## 2024-10-10 PROBLEM — G47.33 OSA (OBSTRUCTIVE SLEEP APNEA): Status: ACTIVE | Noted: 2024-10-10

## 2024-10-10 PROBLEM — K21.9 GASTROESOPHAGEAL REFLUX DISEASE: Status: ACTIVE | Noted: 2024-10-10

## 2024-10-10 PROBLEM — K92.2 GI BLEED: Status: ACTIVE | Noted: 2024-10-10

## 2024-10-10 PROBLEM — I10 HTN (HYPERTENSION): Status: ACTIVE | Noted: 2024-10-10

## 2024-10-10 PROCEDURE — 7100000010 HC PHASE TWO TIME - EACH INCREMENTAL 1 MINUTE

## 2024-10-10 PROCEDURE — 3700000001 HC GENERAL ANESTHESIA TIME - INITIAL BASE CHARGE

## 2024-10-10 PROCEDURE — 44360 SMALL BOWEL ENDOSCOPY: CPT | Performed by: INTERNAL MEDICINE

## 2024-10-10 PROCEDURE — 2500000004 HC RX 250 GENERAL PHARMACY W/ HCPCS (ALT 636 FOR OP/ED): Performed by: NURSE ANESTHETIST, CERTIFIED REGISTERED

## 2024-10-10 PROCEDURE — 2720000007 HC OR 272 NO HCPCS

## 2024-10-10 PROCEDURE — 7100000009 HC PHASE TWO TIME - INITIAL BASE CHARGE

## 2024-10-10 PROCEDURE — 3700000002 HC GENERAL ANESTHESIA TIME - EACH INCREMENTAL 1 MINUTE

## 2024-10-10 RX ORDER — SPIRONOLACTONE 50 MG/1
50 TABLET, FILM COATED ORAL DAILY
COMMUNITY

## 2024-10-10 RX ORDER — PROPOFOL 10 MG/ML
INJECTION, EMULSION INTRAVENOUS AS NEEDED
Status: DISCONTINUED | OUTPATIENT
Start: 2024-10-10 | End: 2024-10-10

## 2024-10-10 RX ORDER — AMLODIPINE BESYLATE 5 MG/1
5 TABLET ORAL DAILY
COMMUNITY

## 2024-10-10 RX ORDER — METOPROLOL SUCCINATE 50 MG/1
50 TABLET, EXTENDED RELEASE ORAL 2 TIMES DAILY
COMMUNITY

## 2024-10-10 RX ORDER — LEVOTHYROXINE SODIUM 50 UG/1
50 TABLET ORAL DAILY
COMMUNITY

## 2024-10-10 ASSESSMENT — PAIN SCALES - GENERAL
PAIN_LEVEL: 0
PAINLEVEL_OUTOF10: 0 - NO PAIN

## 2024-10-10 ASSESSMENT — COLUMBIA-SUICIDE SEVERITY RATING SCALE - C-SSRS
2. HAVE YOU ACTUALLY HAD ANY THOUGHTS OF KILLING YOURSELF?: NO
6. HAVE YOU EVER DONE ANYTHING, STARTED TO DO ANYTHING, OR PREPARED TO DO ANYTHING TO END YOUR LIFE?: NO
1. IN THE PAST MONTH, HAVE YOU WISHED YOU WERE DEAD OR WISHED YOU COULD GO TO SLEEP AND NOT WAKE UP?: NO

## 2024-10-10 ASSESSMENT — PAIN - FUNCTIONAL ASSESSMENT
PAIN_FUNCTIONAL_ASSESSMENT: 0-10

## 2024-10-10 NOTE — ANESTHESIA PREPROCEDURE EVALUATION
Patient: Alicja Romero    Procedure Information       Date/Time: 10/10/24 1200    Scheduled providers: Charles Sapp DO; Fish Aguirre MD; Linsey Mustafa, RN; Madison Duenas, RN; Rubi Kimbrough, RN    Procedure: ENTEROSCOPY    Location: [facility]            Relevant Problems   Anesthesia (within normal limits)      Cardiac   (+) HTN (hypertension)      Pulmonary   (+) CHANNING (obstructive sleep apnea) (Noncompliant w CPAP)      Neuro   (+) History of migraine headaches      GI   (+) GI bleed (Small intestine)   (+) Gastroesophageal reflux disease      /Renal (within normal limits)      Liver (within normal limits)      Endocrine   (+) Hypothyroidism   (+) Obesity      Hematology   (+) Anemia      GYN   (+) Breast cancer (Multi) (L)       Clinical information reviewed:   Tobacco  Allergies  Meds   Med Hx  Surg Hx   Fam Hx  Soc Hx        NPO Detail:  NPO/Void Status  Carbohydrate Drink Given Prior to Surgery? : N  Date of Last Liquid: 10/10/24  Time of Last Liquid: 0750  Date of Last Solid: 10/09/24  Time of Last Solid: 2200  Last Intake Type: Clear fluids  Time of Last Void: 1100         Physical Exam    Airway  Mallampati: II     Cardiovascular    Dental        Pulmonary    Abdominal            Anesthesia Plan    History of general anesthesia?: yes  History of complications of general anesthesia?: no    ASA 3     MAC     intravenous induction   Anesthetic plan and risks discussed with patient.

## 2024-10-10 NOTE — PERIOPERATIVE NURSING NOTE
1310 Assuming care of pt at this time. Bedside report received from outgoing RN.   1315 friend at bedside   1325 no changes   1340 pt reports baseline SBP in the 130s.   Pt meets criteria to discharge from phase 2   Discharge instructions provided to pt and family. Procedural report reviewed. Educated effects of anesthesia, homecoming care following procedure. Pt and family verbalizing understanding of all instructions provided at this time. All questions and concerns answered. Pt given contact information for provider.   1345 Pt assisted with dressing with help of family. IV removed dressing applied.   1422 Dr Sapp at bedside   1430 Pt assisted to main lobby via  by transport. Dc in stable condition to home. All belongings taken with pt.

## 2024-10-10 NOTE — ANESTHESIA POSTPROCEDURE EVALUATION
Patient: Alicja Romero    Procedure Summary       Date: 10/10/24 Room / Location: [facility]    Anesthesia Start: 1251 Anesthesia Stop: 1311    Procedure: ENTEROSCOPY Diagnosis:       Iron deficiency anemia due to chronic blood loss      Angiodysplasia    Scheduled Providers: Charles Sapp DO; Fish Aguirre MD; Linsey Mustafa, RN; Madison Duenas, RN; Rubi Kimbrough, RN Responsible Provider: Fish Aguirre MD    Anesthesia Type: MAC ASA Status: 3            Anesthesia Type: MAC    Vitals Value Taken Time   /63 10/10/24 1310   Temp 36 °C (96.8 °F) 10/10/24 1310   Pulse 68 10/10/24 1310   Resp 16 10/10/24 1310   SpO2 96 % 10/10/24 1310       Anesthesia Post Evaluation    Patient location during evaluation: bedside  Patient participation: complete - patient cannot participate  Level of consciousness: awake  Pain score: 0  Pain management: adequate  Airway patency: patent  Cardiovascular status: acceptable and hemodynamically stable  Respiratory status: acceptable and nonlabored ventilation  Hydration status: acceptable  Postoperative Nausea and Vomiting: none        No notable events documented.

## 2024-10-10 NOTE — DISCHARGE INSTRUCTIONS

## 2025-01-05 PROCEDURE — 96361 HYDRATE IV INFUSION ADD-ON: CPT

## 2025-01-05 PROCEDURE — 96374 THER/PROPH/DIAG INJ IV PUSH: CPT

## 2025-01-05 PROCEDURE — 99285 EMERGENCY DEPT VISIT HI MDM: CPT

## 2025-01-05 ASSESSMENT — PAIN - FUNCTIONAL ASSESSMENT: PAIN_FUNCTIONAL_ASSESSMENT: 0-10

## 2025-01-05 ASSESSMENT — PAIN SCALES - GENERAL: PAINLEVEL_OUTOF10: 10

## 2025-01-06 ENCOUNTER — HOSPITAL ENCOUNTER (EMERGENCY)
Age: 60
Discharge: HOME | End: 2025-01-06
Attending: STUDENT IN AN ORGANIZED HEALTH CARE EDUCATION/TRAINING PROGRAM
Payer: COMMERCIAL

## 2025-01-06 ENCOUNTER — APPOINTMENT (OUTPATIENT)
Dept: CT IMAGING | Age: 60
End: 2025-01-06
Payer: COMMERCIAL

## 2025-01-06 ENCOUNTER — APPOINTMENT (OUTPATIENT)
Dept: GENERAL RADIOLOGY | Age: 60
End: 2025-01-06
Payer: COMMERCIAL

## 2025-01-06 VITALS
HEART RATE: 82 BPM | DIASTOLIC BLOOD PRESSURE: 101 MMHG | BODY MASS INDEX: 45.42 KG/M2 | SYSTOLIC BLOOD PRESSURE: 150 MMHG | WEIGHT: 290 LBS | RESPIRATION RATE: 18 BRPM | OXYGEN SATURATION: 98 % | TEMPERATURE: 97.9 F

## 2025-01-06 DIAGNOSIS — R19.7 NAUSEA VOMITING AND DIARRHEA: Primary | ICD-10-CM

## 2025-01-06 DIAGNOSIS — R11.2 NAUSEA VOMITING AND DIARRHEA: Primary | ICD-10-CM

## 2025-01-06 DIAGNOSIS — N39.0 ACUTE UTI: ICD-10-CM

## 2025-01-06 LAB
ALBUMIN SERPL-MCNC: 4.6 G/DL (ref 3.5–5.2)
ALP SERPL-CCNC: 75 U/L (ref 35–104)
ALT SERPL-CCNC: 53 U/L (ref 0–32)
ANION GAP SERPL CALCULATED.3IONS-SCNC: 17 MMOL/L (ref 7–16)
AST SERPL-CCNC: 57 U/L (ref 0–31)
BACTERIA URNS QL MICRO: ABNORMAL
BASOPHILS # BLD: 0.03 K/UL (ref 0–0.2)
BASOPHILS NFR BLD: 1 % (ref 0–2)
BILIRUB SERPL-MCNC: 1.5 MG/DL (ref 0–1.2)
BILIRUB UR QL STRIP: ABNORMAL
BUN SERPL-MCNC: 8 MG/DL (ref 6–20)
CALCIUM SERPL-MCNC: 10.2 MG/DL (ref 8.6–10.2)
CHLORIDE SERPL-SCNC: 96 MMOL/L (ref 98–107)
CLARITY UR: CLEAR
CO2 SERPL-SCNC: 24 MMOL/L (ref 22–29)
COLOR UR: YELLOW
CREAT SERPL-MCNC: 0.7 MG/DL (ref 0.5–1)
EOSINOPHIL # BLD: 0.01 K/UL (ref 0.05–0.5)
EOSINOPHILS RELATIVE PERCENT: 0 % (ref 0–6)
ERYTHROCYTE [DISTWIDTH] IN BLOOD BY AUTOMATED COUNT: 14.4 % (ref 11.5–15)
FLUAV RNA RESP QL NAA+PROBE: NOT DETECTED
FLUBV RNA RESP QL NAA+PROBE: NOT DETECTED
GFR, ESTIMATED: >90 ML/MIN/1.73M2
GLUCOSE SERPL-MCNC: 122 MG/DL (ref 74–99)
GLUCOSE UR STRIP-MCNC: NEGATIVE MG/DL
HCT VFR BLD AUTO: 39.5 % (ref 34–48)
HGB BLD-MCNC: 12.8 G/DL (ref 11.5–15.5)
HGB UR QL STRIP.AUTO: NEGATIVE
IMM GRANULOCYTES # BLD AUTO: <0.03 K/UL (ref 0–0.58)
IMM GRANULOCYTES NFR BLD: 0 % (ref 0–5)
KETONES UR STRIP-MCNC: 40 MG/DL
LACTATE BLDV-SCNC: 2.7 MMOL/L (ref 0.5–2.2)
LACTATE BLDV-SCNC: 3 MMOL/L (ref 0.5–2.2)
LEUKOCYTE ESTERASE UR QL STRIP: NEGATIVE
LIPASE SERPL-CCNC: 26 U/L (ref 13–60)
LYMPHOCYTES NFR BLD: 1.33 K/UL (ref 1.5–4)
LYMPHOCYTES RELATIVE PERCENT: 30 % (ref 20–42)
MCH RBC QN AUTO: 25.2 PG (ref 26–35)
MCHC RBC AUTO-ENTMCNC: 32.4 G/DL (ref 32–34.5)
MCV RBC AUTO: 77.9 FL (ref 80–99.9)
MONOCYTES NFR BLD: 0.4 K/UL (ref 0.1–0.95)
MONOCYTES NFR BLD: 9 % (ref 2–12)
NEUTROPHILS NFR BLD: 60 % (ref 43–80)
NEUTS SEG NFR BLD: 2.67 K/UL (ref 1.8–7.3)
NITRITE UR QL STRIP: POSITIVE
PH UR STRIP: 6.5 [PH] (ref 5–9)
PLATELET # BLD AUTO: 122 K/UL (ref 130–450)
PMV BLD AUTO: 10.3 FL (ref 7–12)
POTASSIUM SERPL-SCNC: 4.2 MMOL/L (ref 3.5–5)
PROT SERPL-MCNC: 8.3 G/DL (ref 6.4–8.3)
PROT UR STRIP-MCNC: 100 MG/DL
RBC # BLD AUTO: 5.07 M/UL (ref 3.5–5.5)
RBC #/AREA URNS HPF: ABNORMAL /HPF
SARS-COV-2 RNA RESP QL NAA+PROBE: NOT DETECTED
SODIUM SERPL-SCNC: 137 MMOL/L (ref 132–146)
SOURCE: NORMAL
SP GR UR STRIP: 1.02 (ref 1–1.03)
SPECIMEN DESCRIPTION: NORMAL
TROPONIN I SERPL HS-MCNC: <6 NG/L (ref 0–9)
UROBILINOGEN UR STRIP-ACNC: 0.2 EU/DL (ref 0–1)
WBC #/AREA URNS HPF: ABNORMAL /HPF
WBC OTHER # BLD: 4.5 K/UL (ref 4.5–11.5)

## 2025-01-06 PROCEDURE — 81001 URINALYSIS AUTO W/SCOPE: CPT

## 2025-01-06 PROCEDURE — 71046 X-RAY EXAM CHEST 2 VIEWS: CPT

## 2025-01-06 PROCEDURE — 70450 CT HEAD/BRAIN W/O DYE: CPT

## 2025-01-06 PROCEDURE — 84484 ASSAY OF TROPONIN QUANT: CPT

## 2025-01-06 PROCEDURE — 6360000004 HC RX CONTRAST MEDICATION: Performed by: RADIOLOGY

## 2025-01-06 PROCEDURE — 74177 CT ABD & PELVIS W/CONTRAST: CPT

## 2025-01-06 PROCEDURE — 83690 ASSAY OF LIPASE: CPT

## 2025-01-06 PROCEDURE — 2500000003 HC RX 250 WO HCPCS: Performed by: STUDENT IN AN ORGANIZED HEALTH CARE EDUCATION/TRAINING PROGRAM

## 2025-01-06 PROCEDURE — 93005 ELECTROCARDIOGRAM TRACING: CPT | Performed by: STUDENT IN AN ORGANIZED HEALTH CARE EDUCATION/TRAINING PROGRAM

## 2025-01-06 PROCEDURE — 87636 SARSCOV2 & INF A&B AMP PRB: CPT

## 2025-01-06 PROCEDURE — 83605 ASSAY OF LACTIC ACID: CPT

## 2025-01-06 PROCEDURE — 6360000002 HC RX W HCPCS: Performed by: STUDENT IN AN ORGANIZED HEALTH CARE EDUCATION/TRAINING PROGRAM

## 2025-01-06 PROCEDURE — 85025 COMPLETE CBC W/AUTO DIFF WBC: CPT

## 2025-01-06 PROCEDURE — 96375 TX/PRO/DX INJ NEW DRUG ADDON: CPT

## 2025-01-06 PROCEDURE — 2580000003 HC RX 258: Performed by: STUDENT IN AN ORGANIZED HEALTH CARE EDUCATION/TRAINING PROGRAM

## 2025-01-06 PROCEDURE — 80053 COMPREHEN METABOLIC PANEL: CPT

## 2025-01-06 RX ORDER — CEFDINIR 300 MG/1
300 CAPSULE ORAL 2 TIMES DAILY
Qty: 14 CAPSULE | Refills: 0 | Status: SHIPPED | OUTPATIENT
Start: 2025-01-06 | End: 2025-01-13

## 2025-01-06 RX ORDER — METOCLOPRAMIDE HYDROCHLORIDE 5 MG/ML
10 INJECTION INTRAMUSCULAR; INTRAVENOUS ONCE
Status: COMPLETED | OUTPATIENT
Start: 2025-01-06 | End: 2025-01-06

## 2025-01-06 RX ORDER — ONDANSETRON 2 MG/ML
4 INJECTION INTRAMUSCULAR; INTRAVENOUS ONCE
Status: COMPLETED | OUTPATIENT
Start: 2025-01-06 | End: 2025-01-06

## 2025-01-06 RX ORDER — ONDANSETRON 4 MG/1
4 TABLET, ORALLY DISINTEGRATING ORAL 3 TIMES DAILY PRN
Qty: 21 TABLET | Refills: 0 | Status: SHIPPED | OUTPATIENT
Start: 2025-01-06

## 2025-01-06 RX ORDER — 0.9 % SODIUM CHLORIDE 0.9 %
1000 INTRAVENOUS SOLUTION INTRAVENOUS ONCE
Status: COMPLETED | OUTPATIENT
Start: 2025-01-06 | End: 2025-01-06

## 2025-01-06 RX ORDER — DIPHENHYDRAMINE HYDROCHLORIDE 50 MG/ML
25 INJECTION INTRAMUSCULAR; INTRAVENOUS ONCE
Status: COMPLETED | OUTPATIENT
Start: 2025-01-06 | End: 2025-01-06

## 2025-01-06 RX ORDER — IOPAMIDOL 755 MG/ML
80 INJECTION, SOLUTION INTRAVASCULAR
Status: COMPLETED | OUTPATIENT
Start: 2025-01-06 | End: 2025-01-06

## 2025-01-06 RX ADMIN — WATER 1000 MG: 1 INJECTION INTRAMUSCULAR; INTRAVENOUS; SUBCUTANEOUS at 04:10

## 2025-01-06 RX ADMIN — ONDANSETRON 4 MG: 2 INJECTION, SOLUTION INTRAMUSCULAR; INTRAVENOUS at 01:46

## 2025-01-06 RX ADMIN — DIPHENHYDRAMINE HYDROCHLORIDE 25 MG: 50 INJECTION INTRAMUSCULAR; INTRAVENOUS at 04:07

## 2025-01-06 RX ADMIN — METOCLOPRAMIDE HYDROCHLORIDE 10 MG: 5 INJECTION, SOLUTION INTRAMUSCULAR; INTRAVENOUS at 04:08

## 2025-01-06 RX ADMIN — SODIUM CHLORIDE 1000 ML: 9 INJECTION, SOLUTION INTRAVENOUS at 01:47

## 2025-01-06 RX ADMIN — IOPAMIDOL 80 ML: 755 INJECTION, SOLUTION INTRAVENOUS at 02:50

## 2025-01-06 ASSESSMENT — ENCOUNTER SYMPTOMS
DIARRHEA: 1
NAUSEA: 1
ABDOMINAL PAIN: 0
VOMITING: 1
BACK PAIN: 0
COLOR CHANGE: 0
COUGH: 0
SHORTNESS OF BREATH: 0

## 2025-01-06 NOTE — DISCHARGE INSTRUCTIONS
If unable to keep anything down, if you do have fevers, worsening symptoms please go to emergency department for evaluation

## 2025-01-06 NOTE — ED PROVIDER NOTES
HPI   This is a 59-year-old female patient presents emergency department for evaluation of nausea vomiting and diarrhea.  Ongoing for the last day.  No fevers or chills.  No known sick contacts.  She states she has been having some headaches but that is ongoing for 2 weeks, is not better or worse.  She denies any neck stiffness.  She does note some small amounts of blood in stool history of GI bleed which she has been evaluated for in the past not on blood thinners  Review of Systems   Constitutional:  Negative for chills and fever.   HENT:  Negative for congestion.    Respiratory:  Negative for cough and shortness of breath.    Cardiovascular:  Negative for chest pain.   Gastrointestinal:  Positive for diarrhea, nausea and vomiting. Negative for abdominal pain.   Genitourinary:  Negative for difficulty urinating, dysuria and hematuria.   Musculoskeletal:  Negative for back pain.   Skin:  Negative for color change.   Neurological:  Positive for headaches.   All other systems reviewed and are negative.       Physical Exam  Vitals and nursing note reviewed.   Constitutional:       Appearance: Normal appearance.   HENT:      Head: Normocephalic and atraumatic.      Nose: Nose normal. No congestion.      Mouth/Throat:      Mouth: Mucous membranes are moist.      Pharynx: Oropharynx is clear.   Eyes:      Extraocular Movements: Extraocular movements intact.      Conjunctiva/sclera: Conjunctivae normal.      Pupils: Pupils are equal, round, and reactive to light.   Cardiovascular:      Rate and Rhythm: Normal rate and regular rhythm.      Pulses: Normal pulses.      Heart sounds: Normal heart sounds.   Pulmonary:      Effort: Pulmonary effort is normal. No respiratory distress.      Breath sounds: Normal breath sounds.   Abdominal:      General: There is no distension.      Comments: Very mild mid abdominal tenderness without rebound or guarding.   Musculoskeletal:         General: Normal range of motion.      Cervical

## 2025-01-08 LAB
EKG ATRIAL RATE: 71 BPM
EKG Q-T INTERVAL: 454 MS
EKG QRS DURATION: 88 MS
EKG QTC CALCULATION (BAZETT): 493 MS
EKG R AXIS: -8 DEGREES
EKG T AXIS: 5 DEGREES
EKG VENTRICULAR RATE: 71 BPM

## 2025-01-08 PROCEDURE — 93010 ELECTROCARDIOGRAM REPORT: CPT | Performed by: INTERNAL MEDICINE

## 2025-07-21 ENCOUNTER — APPOINTMENT (OUTPATIENT)
Dept: GENERAL RADIOLOGY | Age: 60
End: 2025-07-21
Payer: COMMERCIAL

## 2025-07-21 ENCOUNTER — HOSPITAL ENCOUNTER (EMERGENCY)
Age: 60
Discharge: HOME | End: 2025-07-22
Payer: COMMERCIAL

## 2025-07-21 VITALS
HEART RATE: 66 BPM | OXYGEN SATURATION: 99 % | TEMPERATURE: 97 F | SYSTOLIC BLOOD PRESSURE: 145 MMHG | DIASTOLIC BLOOD PRESSURE: 81 MMHG | RESPIRATION RATE: 20 BRPM

## 2025-07-21 DIAGNOSIS — M25.561 ACUTE PAIN OF RIGHT KNEE: Primary | ICD-10-CM

## 2025-07-21 PROCEDURE — 96372 THER/PROPH/DIAG INJ SC/IM: CPT

## 2025-07-21 PROCEDURE — 6360000002 HC RX W HCPCS: Performed by: PHYSICIAN ASSISTANT

## 2025-07-21 PROCEDURE — 99284 EMERGENCY DEPT VISIT MOD MDM: CPT

## 2025-07-21 PROCEDURE — 6370000000 HC RX 637 (ALT 250 FOR IP): Performed by: PHYSICIAN ASSISTANT

## 2025-07-21 PROCEDURE — 73562 X-RAY EXAM OF KNEE 3: CPT

## 2025-07-21 RX ORDER — HYDROCODONE BITARTRATE AND ACETAMINOPHEN 5; 325 MG/1; MG/1
1 TABLET ORAL ONCE
Status: COMPLETED | OUTPATIENT
Start: 2025-07-21 | End: 2025-07-21

## 2025-07-21 RX ORDER — KETOROLAC TROMETHAMINE 30 MG/ML
30 INJECTION, SOLUTION INTRAMUSCULAR; INTRAVENOUS ONCE
Status: COMPLETED | OUTPATIENT
Start: 2025-07-21 | End: 2025-07-21

## 2025-07-21 RX ADMIN — HYDROCODONE BITARTRATE AND ACETAMINOPHEN 1 TABLET: 5; 325 TABLET ORAL at 22:45

## 2025-07-21 RX ADMIN — KETOROLAC TROMETHAMINE 30 MG: 30 INJECTION, SOLUTION INTRAMUSCULAR at 22:45

## 2025-07-21 ASSESSMENT — PAIN SCALES - GENERAL: PAINLEVEL_OUTOF10: 10

## 2025-07-21 ASSESSMENT — PAIN DESCRIPTION - DESCRIPTORS: DESCRIPTORS: THROBBING

## 2025-07-21 ASSESSMENT — LIFESTYLE VARIABLES
HOW OFTEN DO YOU HAVE A DRINK CONTAINING ALCOHOL: NEVER
HOW MANY STANDARD DRINKS CONTAINING ALCOHOL DO YOU HAVE ON A TYPICAL DAY: PATIENT DOES NOT DRINK

## 2025-07-21 ASSESSMENT — PAIN DESCRIPTION - ORIENTATION: ORIENTATION: RIGHT

## 2025-07-21 ASSESSMENT — PAIN DESCRIPTION - LOCATION: LOCATION: KNEE

## 2025-07-22 PROCEDURE — 6370000000 HC RX 637 (ALT 250 FOR IP): Performed by: PHYSICIAN ASSISTANT

## 2025-07-22 RX ORDER — NAPROXEN 500 MG/1
500 TABLET ORAL 2 TIMES DAILY WITH MEALS
Qty: 20 TABLET | Refills: 0 | Status: SHIPPED | OUTPATIENT
Start: 2025-07-22

## 2025-07-22 RX ORDER — PREDNISONE 20 MG/1
60 TABLET ORAL ONCE
Status: COMPLETED | OUTPATIENT
Start: 2025-07-22 | End: 2025-07-22

## 2025-07-22 RX ADMIN — PREDNISONE 60 MG: 20 TABLET ORAL at 01:18

## 2025-07-22 NOTE — DISCHARGE INSTRUCTIONS
Please return to the ED with new or worsening symptoms. Follow up with PCP for recheck.     
1:56 PM

## 2025-07-22 NOTE — ED PROVIDER NOTES
Independent LUZMARIA Visit.        HPI:  7/21/25, Time: 8:39 PM EDT         Kyleigh Brantley is a 60 y.o. female presenting to the ED for right knee pain, beginning this morning upon awakening.  The complaint has been persistent, moderate in severity, and worsened by standing, walking. History is provided by the patient in the ED today. States that when she got out of bed and put her feet on the floor she felt a pop in her right knee. Has been having some pain in it for the past several weeks as well.  Patient denies any falls or particular injury prior to the onset of symptoms.  Reports the pain is diffuse about the anterior aspect of the right knee and does not radiate.  No numbness or tingling to the right lower extremity.  Afebrile other recent travel or sick contacts.  Patient denies all other symptoms and injuries at this time.    Review of Systems:   A complete review of systems was performed and pertinent positives and negatives are stated within HPI, all other systems reviewed and are negative.          --------------------------------------------- PAST HISTORY ---------------------------------------------  Past Medical History:  has a past medical history of Cancer (HCC), Degenerative arthritis, GERD (gastroesophageal reflux disease), Headache(784.0), Hypertension, Hypothyroidism, Malignant neoplasm of lower-outer quadrant of breast of female, estrogen receptor positive (HCC), Obesity, and Osteoarthritis.    Past Surgical History:  has a past surgical history that includes Cholecystectomy; Tonsillectomy; Tubal ligation; cyst removal; Colonoscopy; Endoscopy, colon, diagnostic; and Upper gastrointestinal endoscopy (N/A, 2/22/2024).    Social History:  reports that she quit smoking about 30 years ago. Her smoking use included cigarettes. She started smoking about 37 years ago. She has a 1.8 pack-year smoking history. She has never used smokeless tobacco. She reports that she does not currently use alcohol after a

## (undated) DEVICE — WIPES SKIN CLOTH READYPREP 9 X 10.5 IN 2% CHLORHEX GLUCONATE CHG PREOP

## (undated) DEVICE — SPONGE GZ 4IN 4IN 4 PLY N WVN AVANT

## (undated) DEVICE — 4-PORT MANIFOLD: Brand: NEPTUNE 2

## (undated) DEVICE — YANKAUER,BULB TIP,W/O VENT,RIGID,STERILE: Brand: MEDLINE

## (undated) DEVICE — Device: Brand: DEFENDO VALVE AND CONNECTOR KIT

## (undated) DEVICE — CONTAINER SPEC COLL 960ML POLYPR TRIANG GRAD INTAKE/OUTPUT

## (undated) DEVICE — BAG SPECIMEN BIOHAZARD 6IN X 9IN

## (undated) DEVICE — GOWN ISOLATN XL BLU POLYPR OVR HD OPN BK KNIT CUF PROTCT

## (undated) DEVICE — AIR/WATER CLEANING ADAPTER FOR OLYMPUS® GI ENDOSCOPE: Brand: BULLDOG®

## (undated) DEVICE — KENDALL 450 SERIES MONITORING FOAM ELECTRODE - RECTANGULAR SHAPE ( 3/PK): Brand: KENDALL

## (undated) DEVICE — MASK,FACE,MAXFLUIDPROTECT,SHIELD/ERLPS: Brand: MEDLINE

## (undated) DEVICE — BLOCK BITE 60FR CAREGUARD

## (undated) DEVICE — JELLY,LUBE,STERILE,FLIP TOP,TUBE,4-OZ: Brand: MEDLINE

## (undated) DEVICE — COVER,LIGHT HANDLE,FLX,1/PK: Brand: MEDLINE INDUSTRIES, INC.

## (undated) DEVICE — FORCEPS BX L240CM JAW DIA2.8MM L CAP W/ NDL MIC MESH TOOTH

## (undated) DEVICE — KIT BEDSIDE REVITAL OX 500ML

## (undated) DEVICE — TUBING, SUCTION, 1/4" X 10', STRAIGHT: Brand: MEDLINE

## (undated) DEVICE — TOWEL,OR,DSP,ST,BLUE,STD,6/PK,12PK/CS: Brand: MEDLINE